# Patient Record
Sex: FEMALE | Race: WHITE | Employment: FULL TIME | URBAN - METROPOLITAN AREA
[De-identification: names, ages, dates, MRNs, and addresses within clinical notes are randomized per-mention and may not be internally consistent; named-entity substitution may affect disease eponyms.]

---

## 2022-04-11 ENCOUNTER — OFFICE VISIT (OUTPATIENT)
Dept: INTERNAL MEDICINE CLINIC | Facility: CLINIC | Age: 32
End: 2022-04-11
Payer: COMMERCIAL

## 2022-04-11 VITALS
OXYGEN SATURATION: 97 % | BODY MASS INDEX: 34.79 KG/M2 | SYSTOLIC BLOOD PRESSURE: 110 MMHG | HEART RATE: 83 BPM | DIASTOLIC BLOOD PRESSURE: 64 MMHG | TEMPERATURE: 98 F | HEIGHT: 67 IN | WEIGHT: 221.63 LBS

## 2022-04-11 DIAGNOSIS — E11.00 UNCONTROLLED TYPE 2 DM WITH HYPEROSMOLAR NONKETOTIC HYPERGLYCEMIA (HCC): Primary | ICD-10-CM

## 2022-04-11 DIAGNOSIS — E66.09 CLASS 1 OBESITY DUE TO EXCESS CALORIES WITHOUT SERIOUS COMORBIDITY WITH BODY MASS INDEX (BMI) OF 34.0 TO 34.9 IN ADULT: ICD-10-CM

## 2022-04-11 PROBLEM — J32.8 OTHER CHRONIC SINUSITIS: Status: ACTIVE | Noted: 2020-07-28

## 2022-04-11 PROCEDURE — 99203 OFFICE O/P NEW LOW 30 MIN: CPT | Performed by: INTERNAL MEDICINE

## 2022-04-11 PROCEDURE — 3008F BODY MASS INDEX DOCD: CPT | Performed by: INTERNAL MEDICINE

## 2022-04-11 PROCEDURE — 3074F SYST BP LT 130 MM HG: CPT | Performed by: INTERNAL MEDICINE

## 2022-04-11 PROCEDURE — 3078F DIAST BP <80 MM HG: CPT | Performed by: INTERNAL MEDICINE

## 2022-05-31 ENCOUNTER — TELEPHONE (OUTPATIENT)
Dept: INTERNAL MEDICINE CLINIC | Facility: CLINIC | Age: 32
End: 2022-05-31

## 2022-05-31 NOTE — TELEPHONE ENCOUNTER
Pt is calling stating that pcp had sent her to endocrinology and is about 5 weeks pregnant. pt states that she has an appointment with pcp on June 13 wants to know if pcp still wants to see her.       Please call and advise

## 2022-06-06 ENCOUNTER — OFFICE VISIT (OUTPATIENT)
Dept: ENDOCRINOLOGY CLINIC | Facility: CLINIC | Age: 32
End: 2022-06-06
Payer: COMMERCIAL

## 2022-06-06 ENCOUNTER — TELEPHONE (OUTPATIENT)
Dept: ENDOCRINOLOGY CLINIC | Facility: CLINIC | Age: 32
End: 2022-06-06

## 2022-06-06 VITALS
DIASTOLIC BLOOD PRESSURE: 78 MMHG | HEART RATE: 78 BPM | BODY MASS INDEX: 35 KG/M2 | WEIGHT: 224.69 LBS | SYSTOLIC BLOOD PRESSURE: 120 MMHG

## 2022-06-06 DIAGNOSIS — O24.111 PREGNANCY WITH TYPE 2 DIABETES MELLITUS IN FIRST TRIMESTER: Primary | ICD-10-CM

## 2022-06-06 LAB
CARTRIDGE LOT#: ABNORMAL NUMERIC
GLUCOSE BLOOD: 130
HEMOGLOBIN A1C: 7 % (ref 4.3–5.6)
TEST STRIP LOT #: NORMAL NUMERIC

## 2022-06-06 PROCEDURE — 82947 ASSAY GLUCOSE BLOOD QUANT: CPT | Performed by: NURSE PRACTITIONER

## 2022-06-06 PROCEDURE — 83036 HEMOGLOBIN GLYCOSYLATED A1C: CPT | Performed by: NURSE PRACTITIONER

## 2022-06-06 PROCEDURE — 3051F HG A1C>EQUAL 7.0%<8.0%: CPT | Performed by: NURSE PRACTITIONER

## 2022-06-06 PROCEDURE — 99204 OFFICE O/P NEW MOD 45 MIN: CPT | Performed by: NURSE PRACTITIONER

## 2022-06-06 PROCEDURE — 3074F SYST BP LT 130 MM HG: CPT | Performed by: NURSE PRACTITIONER

## 2022-06-06 PROCEDURE — 3078F DIAST BP <80 MM HG: CPT | Performed by: NURSE PRACTITIONER

## 2022-06-06 RX ORDER — PEN NEEDLE, DIABETIC 32GX 5/32"
NEEDLE, DISPOSABLE MISCELLANEOUS
Qty: 30 EACH | Refills: 1 | Status: SHIPPED | OUTPATIENT
Start: 2022-06-06 | End: 2022-06-10

## 2022-06-06 RX ORDER — INSULIN DETEMIR 100 [IU]/ML
6 INJECTION, SOLUTION SUBCUTANEOUS NIGHTLY
Qty: 3 ML | Refills: 0 | Status: SHIPPED | OUTPATIENT
Start: 2022-06-06 | End: 2022-07-26

## 2022-06-06 RX ORDER — PEN NEEDLE, DIABETIC 31 GX5/16"
1 NEEDLE, DISPOSABLE MISCELLANEOUS DAILY
Qty: 30 EACH | Refills: 1 | Status: SHIPPED | OUTPATIENT
Start: 2022-06-06

## 2022-06-06 NOTE — TELEPHONE ENCOUNTER
Pt called in stating she was advised to make appt for diabetes education. Pt is wondering if this can be a tele health appt.  Please call

## 2022-06-06 NOTE — PATIENT INSTRUCTIONS
A1C: 7.0% today --> decreased from 8.9% on 3/23/2022  Blood glucose: 130 in clinic today    Medications:   Start Levemir 6 units once nightly       -schedule apt with Homer Owusu (diabetes educator) within 1 week     -please send me a mychart with an update on your blood glucose readings every Thursday       It is important to follow a carb controlled meal plan. You need carbs in order to give energy for the fetus to develop   Carbs:   Insulin therapy:  45 gm Breakfast   60 gm lunch/dinner   15-30 gm HS snack w a protein    A1C goal:  <6.0%    Blood sugar testing:  Test your blood sugar 4 times daily   Recommended times to test: Before breakfast (fasting)       2hrs after breakfast       2hrs after lunch      2hrs after dinner   Blood sugar targets:  Before breakfast: < 90 g  2 hours after meals: < 120

## 2022-06-07 NOTE — TELEPHONE ENCOUNTER
LM to call clinic to change apt on 6/10/22 with Osvaldo Romo to f/u with [facility] for education (Patient prefers to see one provider and had consult with Akira Cramer on 6/6/22)

## 2022-06-09 ENCOUNTER — PATIENT MESSAGE (OUTPATIENT)
Dept: ENDOCRINOLOGY CLINIC | Facility: CLINIC | Age: 32
End: 2022-06-09

## 2022-06-09 DIAGNOSIS — O24.111 PREGNANCY WITH TYPE 2 DIABETES MELLITUS IN FIRST TRIMESTER: Primary | ICD-10-CM

## 2022-06-09 NOTE — TELEPHONE ENCOUNTER
Reviewed case with [facility]. Since I am not able to do telemedicine, advised to reach out to [facility] to see if they are able to. Spoke with Michelle Vazquez from [facility] who confirmed they can see her and she will call to schedule. Order placed for GDM. Pt notified via [employer]. Will plan to cancel appointment with [facility] once plan is confirmed with pt. FU scheduled with [facility] 7/11/2022.

## 2022-06-09 NOTE — TELEPHONE ENCOUNTER
From: Nuria Llamas  To: CYNDY Dupont  Sent: 6/9/2022 8:47 AM CDT  Subject: Appointment 6/10    I was waiting for a call back from the office. I requested our appointment be handled via zoom or some sort of FaceTime if possible. My morning sickness/nausea has been very uncomfortable early mornings especially while driving. I really am eager for this education to make sure my body and baby are getting the proper nutrients all while keeping my sugar under control. Is this possible?

## 2022-06-10 ENCOUNTER — PATIENT MESSAGE (OUTPATIENT)
Dept: ENDOCRINOLOGY CLINIC | Facility: CLINIC | Age: 32
End: 2022-06-10

## 2022-06-10 RX ORDER — INSULIN ASPART 100 [IU]/ML
INJECTION, SOLUTION INTRAVENOUS; SUBCUTANEOUS
Qty: 15 ML | Refills: 0 | Status: SHIPPED | OUTPATIENT
Start: 2022-06-10

## 2022-06-10 RX ORDER — PEN NEEDLE, DIABETIC 32GX 5/32"
NEEDLE, DISPOSABLE MISCELLANEOUS
Qty: 400 EACH | Refills: 1 | Status: SHIPPED | OUTPATIENT
Start: 2022-06-10

## 2022-06-10 NOTE — TELEPHONE ENCOUNTER
Reviewed case with [facility];  Advised to following:    - Increase Levemir to 10 units daily  - Start Novolog (or preferred fast acting) 4 units at each meal    LVOM 10:34am; will mychart message back as well

## 2022-06-10 NOTE — TELEPHONE ENCOUNTER
Spoke with pt and reviewed blood sugars, increasing Levemir, and starting Novolog    Topics discussed:  - Lantus and Novolog MOA  - Priming with 2units of Novolog, administration, and storage  - Blood sugar target ranges  - Very common in pregnancy to increase insulin  - Reviewed nutrition   -- Do not dose differently for foods high in fiber   -- Body needs carbs especially during pregnancy; aim for 175g  - Weight management/gain throughout pregnancy would be discussed by OB  - Appt Monday with [facility] for nutrition (wanted telemedicine)  - Since starting Novolog, advised to send in BG on Tuesday or Weds    Updated regimen:  - Levemir 10 units  - Novolog 4 units TID with meals

## 2022-06-10 NOTE — TELEPHONE ENCOUNTER
From: Alejandra Garcia  To: CYNDY Martinez  Sent: 6/10/2022 8:26 AM CDT  Subject: Sugars 6/6-6/9    Attachment

## 2022-06-13 ENCOUNTER — HOSPITAL ENCOUNTER (OUTPATIENT)
Dept: ENDOCRINOLOGY | Facility: HOSPITAL | Age: 32
End: 2022-06-13
Payer: COMMERCIAL

## 2022-06-13 DIAGNOSIS — O24.119 TYPE 2 DIABETES MELLITUS DURING PREGNANCY, ANTEPARTUM: Primary | ICD-10-CM

## 2022-06-13 NOTE — PROGRESS NOTES
Meena Lopez  : 1990 was seen for Type 2 DM in pregnancy counseling: Individual    Date: 2022   Start time: 9A End time: 10:10A    Realtime audio-video visit via Haiku/[employer]. Individual visit due to pt preference  Pt verbally consents to this audio-video visit. Fam hx of Type 2 DM, diagnosed earlier this year    Diet Recall:   (am) 2 eggs, 3 pieces of breakfast turkey sausage (adding banana)  (lunch) salad with salmon, chicken breast  (dinner) chicken breast with asparagus, sweet potatoes  (snacks) pure protein, pure protein bar, handful of wheat thins, nuts  (fluids) nothing but water, gatorade zero     Eating this way for 2 years   Some nausea   due date  On levemir  Seeing endo - started on long and rapid acting insulin - see notes      *Ask weight next visit, pt concerned with varying weights with changing eating habits    Education:     GDM Overview:  Reviewed gestational diabetes as diagnosis including target blood glucose values. Benefits, risks, and management options for improving/maintaining glucose control to mother/baby discussed. Discussed monitoring ketones. Healthy Eating:  Discussed nutrition concepts for pregnancy/healthy eating and effects of food on BG value. Timing of meals; what is a carbohydrate, protein, fat. Taught: Carb counting, label reading, meal planning. Suggested Calorie Level: 2000    Encouraged at least 175 g of carbs per day    Being Active:  Benefits and effects of activity on BG discussed. Monitoring:  Instructed on how to use glucose monitor/proper lancet disposal. Testing schedules are:   Fastin-95 mg/dL, Call MD if >95 mg/dL twice in 1 week   2 Hour Post Prandial:  120 md/dL, Call MD if >120 mg/dL twice in 1 week. Taking Medication:  Reviewed when medication might be indicated. Reducing Risk:  Effects of elevated blood glucose on mother/baby reviewed.   Discussed management (hyperglycemia, hypoglycemia, sick day, DKA, other) and when to call provider. Post pregnancy management/prevention of Type 2 DM, and increased risk of having diabetes later in life reviewed. Healthy Coping:  Family involvement/social support encouraged. Identification of lifestyle behaviors willing to change discussed. Training Tools Provided:  Printed materials covering each topic provided. Support Plan provided and reviewed. Patient Chosen Goals:      1. Follow recommended GDM meal plan. 2. Begin testing fasting glucose and 2 hours after meals   3. Bring glucose log to each MD office visit. 4. Encouraged activity if no restrictions. 5. Encouraged Haritha Hitchcock to call diabetes center with any questions or concerns. Patient verbalized understanding and has no further questions at this time.     Alvarez Blackman RD

## 2022-06-22 ENCOUNTER — PATIENT MESSAGE (OUTPATIENT)
Dept: ENDOCRINOLOGY CLINIC | Facility: CLINIC | Age: 32
End: 2022-06-22

## 2022-06-23 ENCOUNTER — TELEPHONE (OUTPATIENT)
Dept: ENDOCRINOLOGY | Facility: HOSPITAL | Age: 32
End: 2022-06-23

## 2022-06-27 ENCOUNTER — TELEPHONE (OUTPATIENT)
Dept: ENDOCRINOLOGY | Facility: HOSPITAL | Age: 32
End: 2022-06-27

## 2022-06-27 ENCOUNTER — HOSPITAL ENCOUNTER (OUTPATIENT)
Dept: ENDOCRINOLOGY | Facility: HOSPITAL | Age: 32
End: 2022-06-27
Payer: COMMERCIAL

## 2022-06-27 DIAGNOSIS — O24.119 TYPE 2 DIABETES MELLITUS DURING PREGNANCY, ANTEPARTUM: Primary | ICD-10-CM

## 2022-06-27 NOTE — PROGRESS NOTES
Juan Leblanc  : 1990 was seen for GDM  Individual Follow-Up Counseling    Date: 2022  Referring Provider: Marivel Burgos  Start time: 3P End time: 405P    Realtime audio-video visit via Haiku/[employer]. Individual visit due to pt preference. Pt verbally consents to this audio-video visit. Assessment: LMP 2022   Weight: Wt Readings from Last 6 Encounters:  22 : 224 lb 11.2 oz  22 : 221 lb 9.6 oz      Blood Glucose: FB/7 over 95. PP: B:  (x1 elevated); L: 137, 207, 118, 226, 118; D: 145, 130, 99, 126, 128. Ketones: n/a    Gestational Diabetes goals assessed by patient: 4 - frequently, continue with current goals/plan     Declines weight change   Declines insulin pump at this time - described in dpeth, encouraged     Has been talking to Dann Syed about blood sugars, encouraged increase of 2 units to levemir due to elevated ams    Diet:     Following meal plan: Yes/No: Yes - moderately  Skips: snacks, not needed  Meals are Balanced. Carb Intake is adequate. Protein Intake is adequate. Food Selections are healthy. Exercise:     30 min daily, walking dog    Education:     GDM Overview:  Reviewed target blood glucose values for GDM. Discussed benefits/risks to mother/baby management options to improve/maintain glucose control. Healthy Eating:  Reviewed/Reinforced:  Nutrition concepts for pregnancy/healthy eating and effect of food on blood glucose. Meal planning process and benefits of pre-planning meals/snacks. Appropriate timing of meals/snacks. Carb counting. Additional concepts: Increasing fiber    Being Active:  Reviewed benefits of effects of activity on BG values. Reviewed types of activity, duration, precautions. Monitoring:  Instructed to report readings to MD as directed. Call MD: if FBG is > 95 twice in 1 week. If 2 hr PP is > 120 twice in 1 week at any one meal.  Call Diabetic Educator is ketones are consistently elevated.     Taking Medication:  Reviewed appropriate timing (if on insulin) of meds. Reviewed probability of needing medication adjustments throughout pregnancy. Reducing Risk:  Discussed management of (hyperglycemia, hypoglycemia) and when to call provider. Recommendations:      1. Follow recommended meal plan. 2. Test blood glucose and ketones as directed. 3. Bring glucose log to each MD visit. 4. Start/continue activity if no restrictions. 5. Additional recommendations: Obtain glucose tabs - see patient instructions    Patient verbalized understanding and has no further questions at this time.     Shanthi Zarco RD

## 2022-07-05 NOTE — TELEPHONE ENCOUNTER
Coretta Garcia,    Please see attached blood sugar log:    As of 6/27  - Continue with Levemir 22 units nightly  - Continue Novolog 8 units at breakfast  - Continue Novolog 4 units at lunch  - Increase Novolog to 16 units at dinner

## 2022-07-06 ENCOUNTER — PATIENT MESSAGE (OUTPATIENT)
Dept: ENDOCRINOLOGY CLINIC | Facility: CLINIC | Age: 32
End: 2022-07-06

## 2022-07-11 ENCOUNTER — LAB ENCOUNTER (OUTPATIENT)
Dept: LAB | Age: 32
End: 2022-07-11
Attending: NURSE PRACTITIONER
Payer: COMMERCIAL

## 2022-07-11 ENCOUNTER — APPOINTMENT (OUTPATIENT)
Dept: ENDOCRINOLOGY | Facility: HOSPITAL | Age: 32
End: 2022-07-11
Payer: COMMERCIAL

## 2022-07-11 ENCOUNTER — OFFICE VISIT (OUTPATIENT)
Dept: ENDOCRINOLOGY CLINIC | Facility: CLINIC | Age: 32
End: 2022-07-11
Payer: COMMERCIAL

## 2022-07-11 VITALS
WEIGHT: 223.31 LBS | BODY MASS INDEX: 35 KG/M2 | HEART RATE: 82 BPM | DIASTOLIC BLOOD PRESSURE: 84 MMHG | SYSTOLIC BLOOD PRESSURE: 131 MMHG

## 2022-07-11 DIAGNOSIS — O24.111 PREGNANCY WITH TYPE 2 DIABETES MELLITUS IN FIRST TRIMESTER: ICD-10-CM

## 2022-07-11 DIAGNOSIS — O24.111 PREGNANCY WITH TYPE 2 DIABETES MELLITUS IN FIRST TRIMESTER: Primary | ICD-10-CM

## 2022-07-11 LAB
CARTRIDGE LOT#: ABNORMAL NUMERIC
CREAT UR-SCNC: 120 MG/DL
GLUCOSE BLOOD: 133
HEMOGLOBIN A1C: 6.2 % (ref 4.3–5.6)
MICROALBUMIN UR-MCNC: 0.73 MG/DL
MICROALBUMIN/CREAT 24H UR-RTO: 6.1 UG/MG (ref ?–30)
TEST STRIP LOT #: NORMAL NUMERIC

## 2022-07-11 PROCEDURE — 82570 ASSAY OF URINE CREATININE: CPT

## 2022-07-11 PROCEDURE — 3061F NEG MICROALBUMINURIA REV: CPT | Performed by: NURSE PRACTITIONER

## 2022-07-11 PROCEDURE — 36416 COLLJ CAPILLARY BLOOD SPEC: CPT | Performed by: NURSE PRACTITIONER

## 2022-07-11 PROCEDURE — 3075F SYST BP GE 130 - 139MM HG: CPT | Performed by: NURSE PRACTITIONER

## 2022-07-11 PROCEDURE — 3044F HG A1C LEVEL LT 7.0%: CPT | Performed by: NURSE PRACTITIONER

## 2022-07-11 PROCEDURE — 82947 ASSAY GLUCOSE BLOOD QUANT: CPT | Performed by: NURSE PRACTITIONER

## 2022-07-11 PROCEDURE — 3079F DIAST BP 80-89 MM HG: CPT | Performed by: NURSE PRACTITIONER

## 2022-07-11 PROCEDURE — 99214 OFFICE O/P EST MOD 30 MIN: CPT | Performed by: NURSE PRACTITIONER

## 2022-07-11 PROCEDURE — 82043 UR ALBUMIN QUANTITATIVE: CPT

## 2022-07-11 PROCEDURE — 83036 HEMOGLOBIN GLYCOSYLATED A1C: CPT | Performed by: NURSE PRACTITIONER

## 2022-07-11 RX ORDER — NAPROXEN SODIUM 220 MG
TABLET ORAL
Qty: 30 EACH | Refills: 2 | Status: SHIPPED | OUTPATIENT
Start: 2022-07-11

## 2022-07-11 RX ORDER — DOXYLAMINE SUCCINATE AND PYRIDOXINE HYDROCHLORIDE, DELAYED RELEASE TABLETS 10 MG/10 MG 10; 10 MG/1; MG/1
TABLET, DELAYED RELEASE ORAL
COMMUNITY
Start: 2022-06-27

## 2022-07-11 NOTE — TELEPHONE ENCOUNTER
Arther Halsted has appt today    -Levemir 28 units nightly   -Novolog 8 units with breakfast   -Novolog 6 units with lunch  -Novolog 16 units with dinner

## 2022-07-11 NOTE — PATIENT INSTRUCTIONS
A1C: 6.2% today -->decreased from 7.0% on 6/6/2022  Blood glucose: 133 in clinic today    Medications:   -stop Levemir    -start NPH 34 units nightly     -continue with Novolog 8 units with breakfast                            6 units with lunch                                  16 units with dinner     A1C goal:  <6.0%    Blood sugar testing:  Test your blood sugar 4 times daily   Recommended times to test: Before breakfast, 2hrs after breakfast, 2hrs after lunch and 2hrs after dinner     Blood sugar targets:  Before breakfast: <90  2 hours after meals: <120

## 2022-07-12 ENCOUNTER — PATIENT MESSAGE (OUTPATIENT)
Dept: ENDOCRINOLOGY CLINIC | Facility: CLINIC | Age: 32
End: 2022-07-12

## 2022-07-12 NOTE — TELEPHONE ENCOUNTER
RN called [facility] , surya told rn syringes will be filled for patient today.   Patient notified via my chart

## 2022-07-13 ENCOUNTER — PATIENT MESSAGE (OUTPATIENT)
Dept: ENDOCRINOLOGY CLINIC | Facility: CLINIC | Age: 32
End: 2022-07-13

## 2022-07-14 NOTE — TELEPHONE ENCOUNTER
Alejandro Real 28 units nightly   - Novolog 8 units with breakfast   - Novolog 6 units with lunch  - Novolog 16 units with dinner

## 2022-07-18 LAB
HBV SURFACE AB SER QL: NEGATIVE
HIV 1+2 AB+HIV1 P24 AG SERPL QL IA: NONREACTIVE
RPR SER QL: NONREACTIVE
RUBV IGG SERPL IA-ACNC: NORMAL

## 2022-07-19 ENCOUNTER — PATIENT MESSAGE (OUTPATIENT)
Dept: ENDOCRINOLOGY CLINIC | Facility: CLINIC | Age: 32
End: 2022-07-19

## 2022-07-19 NOTE — TELEPHONE ENCOUNTER
Walter Campbell,    Levemir 28 units  Novolog 8/6/16    Reports also printed and placed on your since she last sent values (7/13/2022)    Date F AB AL AD Regimen   7/14 81    Novolog 8/6/16 Levemir 28   7/15 89 96 119     7/16 76 113 120     7/17 87 137 (cookies) 76 147 (icecream    7/18 78 65 127 127 (ate out)    7/19 81 84

## 2022-07-25 ENCOUNTER — HOSPITAL ENCOUNTER (OUTPATIENT)
Dept: ENDOCRINOLOGY | Facility: HOSPITAL | Age: 32
End: 2022-07-25
Payer: COMMERCIAL

## 2022-07-25 DIAGNOSIS — O24.119 TYPE 2 DIABETES MELLITUS DURING PREGNANCY, ANTEPARTUM: Primary | ICD-10-CM

## 2022-07-25 NOTE — TELEPHONE ENCOUNTER
BG readings reviewed and agree with recommendations by Randall Flores no adjustments needed to insulin regimen. Thank you!

## 2022-07-25 NOTE — TELEPHONE ENCOUNTER
Geeta Tolentino,    Reviewed blood sugars and advised to continue regimen. Let her know that you will return Weds and if there are any further recommendations, you would would reach out then (of note, lunch is slightly higher but still within range).     Date F AB AL AD Regimen   7/14 81       Novolog 8/6/16   NPH 34   7/15 89 96 119       7/16 76 113 120       7/17 87 137 (cookies) 76 147 (icecream     7/18 78 65 127 127 (ate out)     7/19 81 84      Novolog 6/6/16  NPH 34   7/20 91 90 90 104    7/21 81 97 119 NA (fell asleep)    7/22 97 104 119 99    7/23 79 81 115 115    7/24 90 78 119

## 2022-07-25 NOTE — PROGRESS NOTES
Gurdeep Cade  : 1990 was seen for GDM  Individual Follow-Up Counseling    Date: 2022  Referring Provider: Elyse Pepper  Start time: 1030A End time: 11A    Realtime audio-video visit via Haiku/[employer]. Individual visit due to pt preference. Pt verbally consents to this audio-video visit. Assessment: LMP 2022   Weight: Wt Readings from Last 6 Encounters:  22 : 223 lb 4.8 oz  22 : 224 lb 11.2 oz  22 : 221 lb 9.6 oz    Asking about weight gain recommendations during pregnancy. Lost 25# from [facility] until conception    Blood Glucose: See scanned document. Ketones: negative ketones when checked 3-4 days    Gestational Diabetes goals assessed by patient: 4 - frequently, continue with current goals/plan     Declines weight change   Declines insulin pump at this time - described in depth, encouraged     Has been talking to [facility] about blood sugars, occasional lows post B and novolog was decreased    Diet Recall:   (am) breakfast sandwich - egg, turkey  Running errands  Small 17 g carb snack  (4p) maggiano[employer] - limited carbs/balanced meal   (late evening) sugar free pudding    Diet:     Following meal plan: Yes/No: Yes - moderately  Skips: snacks, not needed  Meals are Balanced. Carb Intake is adequate. Protein Intake is adequate. Food Selections are healthy. Some nausea/food aversion still    Exercise:     30 min daily, walking dog    Education:     GDM Overview:  Reviewed target blood glucose values for GDM. Discussed benefits/risks to mother/baby management options to improve/maintain glucose control. Healthy Eating:  Reviewed/Reinforced:  Nutrition concepts for pregnancy/healthy eating and effect of food on blood glucose. Meal planning process and benefits of pre-planning meals/snacks. Appropriate timing of meals/snacks. Carb counting. Additional concepts: Increasing fiber    Being Active:  Reviewed benefits of effects of activity on BG values.   Reviewed types of activity, duration, precautions. Monitoring:  Instructed to report readings to MD as directed. Call MD: if FBG is > 95 twice in 1 week. If 2 hr PP is > 120 twice in 1 week at any one meal.  Call Diabetic Educator is ketones are consistently elevated. Taking Medication:  Reviewed appropriate timing (if on insulin) of meds. Reviewed probability of needing medication adjustments throughout pregnancy. Reducing Risk:  Discussed management of (hyperglycemia, hypoglycemia) and when to call provider. Recommendations:      1. Follow recommended meal plan. 2. Test blood glucose and ketones as directed. 3. Bring glucose log to each MD visit. 4. Start/continue activity if no restrictions. 5. Additional recommendations: See prior patient instructions. Continue to test ketones if less than 175 g of carbs or downtrending weight. Patient verbalized understanding and has no further questions at this time.     Odalis Olvera RD

## 2022-08-02 ENCOUNTER — PATIENT MESSAGE (OUTPATIENT)
Dept: ENDOCRINOLOGY CLINIC | Facility: CLINIC | Age: 32
End: 2022-08-02

## 2022-08-02 RX ORDER — INSULIN ASPART 100 [IU]/ML
INJECTION, SOLUTION INTRAVENOUS; SUBCUTANEOUS
Qty: 25 ML | Refills: 0 | Status: SHIPPED | OUTPATIENT
Start: 2022-08-02 | End: 2022-08-08

## 2022-08-02 NOTE — TELEPHONE ENCOUNTER
NPH 34 units nightly     Novolog 6 units with breakfast    6 units with lunch   16 units with dinner     Pended rx updated to make above doses. Thank you!

## 2022-08-08 RX ORDER — INSULIN ASPART 100 [IU]/ML
INJECTION, SOLUTION INTRAVENOUS; SUBCUTANEOUS
Qty: 27 ML | Refills: 0 | Status: SHIPPED | OUTPATIENT
Start: 2022-08-08

## 2022-08-09 ENCOUNTER — PATIENT MESSAGE (OUTPATIENT)
Dept: ENDOCRINOLOGY CLINIC | Facility: CLINIC | Age: 32
End: 2022-08-09

## 2022-08-09 RX ORDER — NAPROXEN SODIUM 220 MG
TABLET ORAL
Qty: 30 EACH | Refills: 2 | Status: SHIPPED | OUTPATIENT
Start: 2022-08-09

## 2022-08-09 NOTE — TELEPHONE ENCOUNTER
NPH 34 units  Novolog 16  Placed on your desk    Fastin/3 98   74  : 70  : 90  : 86  : 86  : 81    AB:  8/3: 130   94  : 110  : 92  : 96  : 91    AL:  8/3: 109  : 147  : 86  : 86    After Dinner:  8/3: 82  : 110  : 105  : 130

## 2022-08-22 ENCOUNTER — OFFICE VISIT (OUTPATIENT)
Dept: ENDOCRINOLOGY CLINIC | Facility: CLINIC | Age: 32
End: 2022-08-22
Payer: COMMERCIAL

## 2022-08-22 VITALS
BODY MASS INDEX: 35 KG/M2 | WEIGHT: 222 LBS | HEART RATE: 89 BPM | DIASTOLIC BLOOD PRESSURE: 85 MMHG | SYSTOLIC BLOOD PRESSURE: 122 MMHG

## 2022-08-22 DIAGNOSIS — O24.112 PREGNANCY WITH TYPE 2 DIABETES MELLITUS IN SECOND TRIMESTER: Primary | ICD-10-CM

## 2022-08-22 LAB
CARTRIDGE LOT#: NORMAL NUMERIC
GLUCOSE BLOOD: 114
TEST STRIP LOT #: NORMAL NUMERIC

## 2022-08-22 NOTE — PATIENT INSTRUCTIONS
A1C: 4.9% today --> decreased from 6.2% on 7/11/2022  Blood glucose: 79 in clinic today    Medications:   -Decrease NPH 28 units nightly    - if in the next 2-3 days, fasting BG readings are in 70s, decrease NPH dose to 26 units nightly   -Decrease Novolog: stop with breakfast         4 units with lunch                     16--> 14 units with dinner     Weight:  Wt Readings from Last 6 Encounters:  08/22/22 : 222 lb (100.7 kg)  07/11/22 : 223 lb 4.8 oz (101.3 kg)  06/06/22 : 224 lb 11.2 oz (101.9 kg)  04/11/22 : 221 lb 9.6 oz (100.5 kg)     A1C goal:   <6.0%    Blood sugar testing:  Test your blood sugar 4 times daily   Recommended times to test: Before breakfast (fasting) and 2hrs after meals     Blood sugar targets:  Before breakfast: <90  2 hours after meals: <120
No

## 2022-08-30 ENCOUNTER — PATIENT MESSAGE (OUTPATIENT)
Dept: ENDOCRINOLOGY CLINIC | Facility: CLINIC | Age: 32
End: 2022-08-30

## 2022-08-31 NOTE — TELEPHONE ENCOUNTER
From: Bernice Ly  Sent: 8/30/2022 2:44 PM CDT  To: Elyse Kevin Clinical Staff  Subject: Sugars     Also, when should I be in next for a visit.  I can book it through my chart

## 2022-09-05 ENCOUNTER — PATIENT MESSAGE (OUTPATIENT)
Dept: ENDOCRINOLOGY CLINIC | Facility: CLINIC | Age: 32
End: 2022-09-05

## 2022-09-06 RX ORDER — NAPROXEN SODIUM 220 MG
TABLET ORAL
Qty: 30 EACH | Refills: 2 | Status: SHIPPED | OUTPATIENT
Start: 2022-09-06

## 2022-09-08 ENCOUNTER — PATIENT MESSAGE (OUTPATIENT)
Dept: ENDOCRINOLOGY CLINIC | Facility: CLINIC | Age: 32
End: 2022-09-08

## 2022-09-20 NOTE — TELEPHONE ENCOUNTER
Routing BG update attachment to provider to review    NPH: 26 units nightly  Novolog 4 units with lunch, 14 units with dinner

## 2022-09-26 ENCOUNTER — OFFICE VISIT (OUTPATIENT)
Dept: ENDOCRINOLOGY CLINIC | Facility: CLINIC | Age: 32
End: 2022-09-26
Payer: COMMERCIAL

## 2022-09-26 VITALS
DIASTOLIC BLOOD PRESSURE: 75 MMHG | BODY MASS INDEX: 35 KG/M2 | SYSTOLIC BLOOD PRESSURE: 112 MMHG | HEART RATE: 92 BPM | WEIGHT: 226 LBS

## 2022-09-26 DIAGNOSIS — O24.112 PREGNANCY WITH TYPE 2 DIABETES MELLITUS IN SECOND TRIMESTER: Primary | ICD-10-CM

## 2022-09-26 LAB
CARTRIDGE LOT#: NORMAL NUMERIC
GLUCOSE BLOOD: 132
TEST STRIP LOT #: NORMAL NUMERIC

## 2022-09-26 PROCEDURE — 3044F HG A1C LEVEL LT 7.0%: CPT | Performed by: NURSE PRACTITIONER

## 2022-09-26 PROCEDURE — 3078F DIAST BP <80 MM HG: CPT | Performed by: NURSE PRACTITIONER

## 2022-09-26 PROCEDURE — 99213 OFFICE O/P EST LOW 20 MIN: CPT | Performed by: NURSE PRACTITIONER

## 2022-09-26 PROCEDURE — 83036 HEMOGLOBIN GLYCOSYLATED A1C: CPT | Performed by: NURSE PRACTITIONER

## 2022-09-26 PROCEDURE — 3074F SYST BP LT 130 MM HG: CPT | Performed by: NURSE PRACTITIONER

## 2022-09-26 PROCEDURE — 82947 ASSAY GLUCOSE BLOOD QUANT: CPT | Performed by: NURSE PRACTITIONER

## 2022-09-26 NOTE — PATIENT INSTRUCTIONS
A1C: 4.8% today -->decrease from 4.9% on 2022  Blood glucose: 132 in clinic today    Medications:   - continue with NPH 26 units nightly   - decrease Novolo units with lunch                        12 units with dinner     A1C goal:  <6.0%    Blood sugar testing:  Test your blood sugar 4 times daily   Recommended times to test: before breakfast (fasting) and 2hrs after meals     Blood sugar targets:  Before breakfast: <90  2 hours after meals: <120

## 2022-09-26 NOTE — TELEPHONE ENCOUNTER
[facility],    Please see patient message.  I asked patient to provide updated BG log from week of 9/19

## 2022-09-30 ENCOUNTER — PATIENT MESSAGE (OUTPATIENT)
Dept: ENDOCRINOLOGY CLINIC | Facility: CLINIC | Age: 32
End: 2022-09-30

## 2022-10-07 RX ORDER — NAPROXEN SODIUM 220 MG
TABLET ORAL
Qty: 30 EACH | Refills: 2 | Status: SHIPPED | OUTPATIENT
Start: 2022-10-07

## 2022-10-10 RX ORDER — HUMAN INSULIN 100 [IU]/ML
INJECTION, SUSPENSION SUBCUTANEOUS
Qty: 30 ML | Refills: 0 | Status: SHIPPED | OUTPATIENT
Start: 2022-10-10

## 2022-10-12 ENCOUNTER — PATIENT MESSAGE (OUTPATIENT)
Dept: ENDOCRINOLOGY CLINIC | Facility: CLINIC | Age: 32
End: 2022-10-12

## 2022-10-12 NOTE — TELEPHONE ENCOUNTER
Dear Zaira Sibley,    Here is the patient's last MyChart message,     BG log:        AM           BF         L             D  10/8 90 113 126-forgot insulin Not recorded   10/9 81 98 Not recorded 125   10/10 91 125 84 121   10/11 85 82 Not recorded Not recorded   10/12 98        Patient confirmed NPH to 22 units which is seen on MyChart 9/30

## 2022-10-19 NOTE — TELEPHONE ENCOUNTER
King Lety on your desk    - NPH 22 units  - Novolog only at dinner 12 units (stopped lunch 1 week ago)

## 2022-10-28 ENCOUNTER — PATIENT MESSAGE (OUTPATIENT)
Dept: ENDOCRINOLOGY CLINIC | Facility: CLINIC | Age: 32
End: 2022-10-28

## 2022-10-31 ENCOUNTER — OFFICE VISIT (OUTPATIENT)
Dept: ENDOCRINOLOGY CLINIC | Facility: CLINIC | Age: 32
End: 2022-10-31
Payer: COMMERCIAL

## 2022-10-31 VITALS
WEIGHT: 235.31 LBS | SYSTOLIC BLOOD PRESSURE: 131 MMHG | DIASTOLIC BLOOD PRESSURE: 81 MMHG | HEART RATE: 98 BPM | BODY MASS INDEX: 37 KG/M2

## 2022-10-31 DIAGNOSIS — O24.112 PREGNANCY WITH TYPE 2 DIABETES MELLITUS IN SECOND TRIMESTER: Primary | ICD-10-CM

## 2022-10-31 LAB
CARTRIDGE LOT#: NORMAL NUMERIC
GLUCOSE BLOOD: 158
HEMOGLOBIN A1C: 4.7 % (ref 4.3–5.6)
TEST STRIP LOT #: NORMAL NUMERIC

## 2022-10-31 RX ORDER — PERPHENAZINE 16 MG/1
TABLET, FILM COATED ORAL
Qty: 400 EACH | Refills: 0 | Status: SHIPPED | OUTPATIENT
Start: 2022-10-31

## 2022-10-31 RX ORDER — PEN NEEDLE, DIABETIC 32GX 5/32"
NEEDLE, DISPOSABLE MISCELLANEOUS
Qty: 200 EACH | Refills: 0 | Status: SHIPPED | OUTPATIENT
Start: 2022-10-31

## 2022-10-31 RX ORDER — INSULIN ASPART 100 [IU]/ML
INJECTION, SOLUTION INTRAVENOUS; SUBCUTANEOUS
Qty: 3 ML | Refills: 0 | Status: SHIPPED | OUTPATIENT
Start: 2022-10-31

## 2022-10-31 NOTE — PATIENT INSTRUCTIONS
A1C: 4.7%  --> slight decrease from 4.8% on 2022  Blood glucose: 158 in clinic today    Medications:   -continue with NPH 24 units nightly (try injecting the same time each night)  -Increase Novolo --> 14 units with dinner     Weight:  Wt Readings from Last 6 Encounters:  10/31/22 : 235 lb 4.8 oz (106.7 kg)  22 : 226 lb (102.5 kg)  22 : 222 lb (100.7 kg)  22 : 223 lb 4.8 oz (101.3 kg)  22 : 224 lb 11.2 oz (101.9 kg)  22 : 221 lb 9.6 oz (100.5 kg)    A1C goal:  <6.0%    Blood sugar testing:  Test your blood sugar 4 times daily   Recommended times to test: Before breakfast (fasting) and 2hrs after meals     Blood sugar targets:  Before breakfast: <90  - when not pregnant: 80- 130 (preferably <110)  2 hours after meals: <120  - when not pregnant: <140       Call for persistent blood sugars < 75 or > 200

## 2022-11-05 ENCOUNTER — PATIENT MESSAGE (OUTPATIENT)
Dept: ENDOCRINOLOGY CLINIC | Facility: CLINIC | Age: 32
End: 2022-11-05

## 2022-11-06 ENCOUNTER — PATIENT MESSAGE (OUTPATIENT)
Dept: ENDOCRINOLOGY CLINIC | Facility: CLINIC | Age: 32
End: 2022-11-06

## 2022-11-06 RX ORDER — PEN NEEDLE, DIABETIC 32GX 5/32"
NEEDLE, DISPOSABLE MISCELLANEOUS
Qty: 100 EACH | Refills: 0 | Status: SHIPPED | OUTPATIENT
Start: 2022-11-06

## 2022-11-06 RX ORDER — NAPROXEN SODIUM 220 MG
TABLET ORAL
Qty: 30 EACH | Refills: 2 | Status: SHIPPED | OUTPATIENT
Start: 2022-11-06

## 2022-11-10 ENCOUNTER — PATIENT MESSAGE (OUTPATIENT)
Dept: ENDOCRINOLOGY CLINIC | Facility: CLINIC | Age: 32
End: 2022-11-10

## 2022-11-10 NOTE — TELEPHONE ENCOUNTER
[place],    Please placed on your desk for review    NPH 24 units nightly  Novolog 14 units with dinner    Right now your schedule is on hold - OK to offer Dec 5th at [facility]?

## 2022-11-11 NOTE — TELEPHONE ENCOUNTER
Please book apt as discussed with patient below. May ask Radha Ross for assistance as needed. Thank you!

## 2022-11-11 NOTE — TELEPHONE ENCOUNTER
From: Abram Jarrett  Sent: 11/10/2022 11:58 PM CST  To: Elyse Kevin Clinical Staff  Subject: Blood Glucose    12:45 will work    Thank you again!

## 2022-11-15 ENCOUNTER — PATIENT MESSAGE (OUTPATIENT)
Dept: ENDOCRINOLOGY CLINIC | Facility: CLINIC | Age: 32
End: 2022-11-15

## 2022-11-15 NOTE — TELEPHONE ENCOUNTER
Future Appointments   Date Time Provider Fidel Martínez   12/5/2022 12:45 PM CYNDY AlbrechtADOENDO ALIS [facility]O

## 2022-11-23 NOTE — TELEPHONE ENCOUNTER
Gerardo Real,    Please see patient update. Lets decrease NPH at bedtime from 24--> 20 units and also push it up by 2 hrs in the evening.  For example, if you typically take NPH at 9pm, lets try 7pm

## 2022-11-23 NOTE — TELEPHONE ENCOUNTER
Current medication regimen:   NPH 24 units bedtime   Novolog 12 units with dinner        Fasting 2hrsAB 2hrAL 2hrsAD  11/10 95 107 87 130  11/11 92 97 165 131  11/12 95 X X 124  11/13 105 135 X X   11/14 85 115 130 106  11/15 89 99 128 X  11/16 87 134 104 129  11/17 79

## 2022-12-01 ENCOUNTER — PATIENT MESSAGE (OUTPATIENT)
Dept: ENDOCRINOLOGY CLINIC | Facility: CLINIC | Age: 32
End: 2022-12-01

## 2022-12-01 NOTE — TELEPHONE ENCOUNTER
F 2hrB 2hrL 2hrD  11/24 88  132  11/25 100 79 84 (71) 133  11/26 97  113   11/28 101 91 82 110  11/29 100 198 70's (without eating)    NPH 20 (decrease from 24 11/23)  Novolog 12 with dinner    Reviewed regimen with [address] as Lenin Cee is out of office. Discussed either increasing NPH to 22 units or increasing dinner Novolog dose. Will plan to increase NPH to 22 units and update on Monday. Sooner, if low blood sugars occur.

## 2022-12-01 NOTE — TELEPHONE ENCOUNTER
Deutsch Holly responded to pt to see how the last few days of BG have been. The attached BG show slightly above target range for fasting () with NPH20 but I believe she was having low blood sugar with NPH24    Your Monday Dec 12th [facility] has 1 opening but you do have some openings for Dec 19th at [facility].

## 2022-12-02 ENCOUNTER — PATIENT MESSAGE (OUTPATIENT)
Dept: ENDOCRINOLOGY CLINIC | Facility: CLINIC | Age: 32
End: 2022-12-02

## 2022-12-02 NOTE — TELEPHONE ENCOUNTER
Marguerite Menjivar noted. Agree with recommendations given below. Ok to use any of the apts available for either day. 12/12 would be best if she is available. Thank you!

## 2022-12-05 ENCOUNTER — TELEPHONE (OUTPATIENT)
Dept: MATERNAL FETAL MEDICINE | Age: 32
End: 2022-12-05

## 2022-12-05 RX ORDER — NAPROXEN SODIUM 220 MG
TABLET ORAL
Qty: 30 EACH | Refills: 2 | Status: SHIPPED | OUTPATIENT
Start: 2022-12-05

## 2022-12-06 ENCOUNTER — TELEPHONE (OUTPATIENT)
Dept: MATERNAL FETAL MEDICINE | Age: 32
End: 2022-12-06

## 2022-12-07 ENCOUNTER — PATIENT MESSAGE (OUTPATIENT)
Dept: ENDOCRINOLOGY CLINIC | Facility: CLINIC | Age: 32
End: 2022-12-07

## 2022-12-07 ENCOUNTER — TELEPHONE (OUTPATIENT)
Dept: MATERNAL FETAL MEDICINE | Age: 32
End: 2022-12-07

## 2022-12-07 DIAGNOSIS — O24.112 PREGNANCY WITH TYPE 2 DIABETES MELLITUS IN SECOND TRIMESTER: Primary | ICD-10-CM

## 2022-12-07 DIAGNOSIS — Z79.4 ENCOUNTER FOR LONG-TERM (CURRENT) USE OF INSULIN (CMD): Primary | ICD-10-CM

## 2022-12-08 NOTE — TELEPHONE ENCOUNTER
Fasting  AB AL After dinner   12/2 91   12/3 101 93 144 (ate out) X  12/4 81 X X 127 (no insulin)   12/5 89 89 106 160 (ate out)  12/6 88 123 111 137    12/7 104

## 2022-12-12 ENCOUNTER — V-VISIT (OUTPATIENT)
Dept: MATERNAL FETAL MEDICINE | Age: 32
End: 2022-12-12
Attending: OBSTETRICS & GYNECOLOGY

## 2022-12-12 DIAGNOSIS — O24.113 PRE-EXISTING TYPE 2 DIABETES MELLITUS DURING PREGNANCY IN THIRD TRIMESTER: Primary | ICD-10-CM

## 2022-12-12 LAB
HIV 1+2 AB+HIV1 P24 AG SERPL QL IA: NONREACTIVE
RPR SER QL: NON REACTIVE

## 2022-12-12 PROCEDURE — 99203 OFFICE O/P NEW LOW 30 MIN: CPT | Performed by: OBSTETRICS & GYNECOLOGY

## 2022-12-12 RX ORDER — CYCLOBENZAPRINE HCL 10 MG
10 TABLET ORAL EVERY 8 HOURS PRN
Status: ON HOLD | COMMUNITY
Start: 2022-12-08 | End: 2023-01-20 | Stop reason: HOSPADM

## 2022-12-12 RX ORDER — PERPHENAZINE 16 MG/1
TABLET, FILM COATED ORAL
Status: ON HOLD | COMMUNITY
Start: 2022-11-01 | End: 2023-01-20 | Stop reason: HOSPADM

## 2022-12-12 RX ORDER — VITAMIN A ACETATE, BETA CAROTENE, ASCORBIC ACID, CHOLECALCIFEROL, .ALPHA.-TOCOPHEROL ACETATE, DL-, THIAMINE MONONITRATE, RIBOFLAVIN, NIACINAMIDE, PYRIDOXINE HYDROCHLORIDE, FOLIC ACID, CYANOCOBALAMIN, CALCIUM CARBONATE, FERROUS FUMARATE, ZINC OXIDE, CUPRIC OXIDE 3080; 12; 120; 400; 1; 1.84; 3; 20; 22; 920; 25; 200; 27; 10; 2 [IU]/1; UG/1; MG/1; [IU]/1; MG/1; MG/1; MG/1; MG/1; MG/1; [IU]/1; MG/1; MG/1; MG/1; MG/1; MG/1
1 TABLET, FILM COATED ORAL DAILY
COMMUNITY

## 2022-12-12 RX ORDER — HUMAN INSULIN 100 [IU]/ML
INJECTION, SUSPENSION SUBCUTANEOUS
Status: ON HOLD | COMMUNITY
Start: 2022-10-10 | End: 2023-01-20 | Stop reason: HOSPADM

## 2022-12-12 RX ORDER — INSULIN ASPART 100 [IU]/ML
INJECTION, SOLUTION INTRAVENOUS; SUBCUTANEOUS
Status: ON HOLD | COMMUNITY
Start: 2022-11-04 | End: 2023-01-20 | Stop reason: HOSPADM

## 2022-12-12 RX ORDER — ONDANSETRON 4 MG/1
4 TABLET, FILM COATED ORAL EVERY 6 HOURS PRN
Status: ON HOLD | COMMUNITY
Start: 2022-11-14 | End: 2023-01-20 | Stop reason: HOSPADM

## 2022-12-12 RX ORDER — PEN NEEDLE, DIABETIC 32GX 5/32"
NEEDLE, DISPOSABLE MISCELLANEOUS
COMMUNITY
Start: 2022-11-06

## 2022-12-12 RX ORDER — PEN NEEDLE, DIABETIC 29 G X1/2"
NEEDLE, DISPOSABLE MISCELLANEOUS
COMMUNITY
Start: 2022-12-05

## 2022-12-13 RX ORDER — INSULIN DETEMIR 100 [IU]/ML
10 INJECTION, SOLUTION SUBCUTANEOUS NIGHTLY
Qty: 3 ML | Refills: 0 | Status: SHIPPED | OUTPATIENT
Start: 2022-12-13

## 2022-12-14 NOTE — TELEPHONE ENCOUNTER
[facility] No [facility]. Left msg that she calls me back today. Patient will likely need to start taking Novololg with breakfast and possibly lunch also. Depending on her BG readings will have to decide on doses. Would like to clarify if she started to take levemir last night before recommending novolog meal doses. Thank you!

## 2022-12-14 NOTE — TELEPHONE ENCOUNTER
Odette Cherry,    Please see below. As long as the levemir pens have NOT been open and have been in the fridge, they are good till the expiration date on the pen/box.

## 2022-12-15 ENCOUNTER — PATIENT MESSAGE (OUTPATIENT)
Dept: ENDOCRINOLOGY CLINIC | Facility: CLINIC | Age: 32
End: 2022-12-15

## 2022-12-15 NOTE — TELEPHONE ENCOUNTER
Reviewed plan with José Winter ordered and sent to pt  - Appt changed to virtual   - [facility]

## 2022-12-17 LAB — HEMOGLOBIN A1C: 5.1 % OF TOTAL HGB

## 2022-12-19 ENCOUNTER — TELEMEDICINE (OUTPATIENT)
Dept: ENDOCRINOLOGY CLINIC | Facility: CLINIC | Age: 32
End: 2022-12-19

## 2022-12-19 DIAGNOSIS — O24.419 GESTATIONAL DIABETES MELLITUS IN PREGNANCY: Primary | ICD-10-CM

## 2022-12-19 DIAGNOSIS — O24.113 PREGNANCY COMPLICATED BY PRE-EXISTING TYPE 2 DIABETES IN THIRD TRIMESTER: Primary | ICD-10-CM

## 2022-12-19 NOTE — PATIENT INSTRUCTIONS
A1C: 5.1% on 12/16/2022 --> slight increase from 4.7% on 10/31/2022    Medications:   -increase levemir 14 --> 17 units nightly   -stop Novolog with breakfast   -no novolog with lunch  -continue with Novolog 14 units with dinner     After baby is delivered:  No medications       A1C goal:  <6.0%    Blood sugar testing:  Test your blood sugar 3 times daily   Recommended times to test: Before breakfast (fasting), 2hrs after breakfast, 2hrs after lunch and 2hrs after dinner     Blood sugar targets:  Before breakfast: <90  2 hours after meals: <120    Call for persistent blood sugars < 75 or > 200.

## 2022-12-21 ENCOUNTER — PATIENT MESSAGE (OUTPATIENT)
Dept: ENDOCRINOLOGY CLINIC | Facility: CLINIC | Age: 32
End: 2022-12-21

## 2022-12-23 LAB — GBS EXTERNAL: NEGATIVE

## 2022-12-27 ENCOUNTER — APPOINTMENT (OUTPATIENT)
Dept: CARDIOLOGY | Age: 32
End: 2022-12-27
Attending: OBSTETRICS & GYNECOLOGY

## 2022-12-27 NOTE — TELEPHONE ENCOUNTER
Amanda Finney,    Please see attached    Levemir 20 units  Novolog 17 units with dinner (previous MC recommended but 16 but BG log states 17)

## 2022-12-30 NOTE — TELEPHONE ENCOUNTER
LVOM 2:25pm as pt has not seen mychart; reviewed options on voicemail and that doctor is always on call too

## 2023-01-10 RX ORDER — HUMAN INSULIN 100 [IU]/ML
INJECTION, SUSPENSION SUBCUTANEOUS
Qty: 30 ML | Refills: 0 | Status: SHIPPED | OUTPATIENT
Start: 2023-01-10

## 2023-01-10 NOTE — TELEPHONE ENCOUNTER
LOV:10/31/22    RTC: 4wks    F/U:No FU/pt will FU after delivery date     Pending Monthly Supply:orders pending,please approve if appropriate.

## 2023-01-15 ENCOUNTER — LAB SERVICES (OUTPATIENT)
Dept: LAB | Age: 33
End: 2023-01-15

## 2023-01-15 DIAGNOSIS — Z01.812 ENCOUNTER FOR PREPROCEDURE SCREENING LABORATORY TESTING FOR COVID-19: Primary | ICD-10-CM

## 2023-01-15 DIAGNOSIS — Z11.52 ENCOUNTER FOR PREPROCEDURE SCREENING LABORATORY TESTING FOR COVID-19: Primary | ICD-10-CM

## 2023-01-15 PROCEDURE — U0003 INFECTIOUS AGENT DETECTION BY NUCLEIC ACID (DNA OR RNA); SEVERE ACUTE RESPIRATORY SYNDROME CORONAVIRUS 2 (SARS-COV-2) (CORONAVIRUS DISEASE [COVID-19]), AMPLIFIED PROBE TECHNIQUE, MAKING USE OF HIGH THROUGHPUT TECHNOLOGIES AS DESCRIBED BY CMS-2020-01-R: HCPCS | Performed by: INTERNAL MEDICINE

## 2023-01-15 PROCEDURE — U0005 INFEC AGEN DETEC AMPLI PROBE: HCPCS | Performed by: INTERNAL MEDICINE

## 2023-01-16 LAB
SARS-COV-2 RNA RESP QL NAA+PROBE: NOT DETECTED
SERVICE CMNT-IMP: NORMAL
SERVICE CMNT-IMP: NORMAL

## 2023-01-17 ENCOUNTER — APPOINTMENT (OUTPATIENT)
Dept: OBGYN | Age: 33
End: 2023-01-17

## 2023-01-17 ENCOUNTER — HOSPITAL ENCOUNTER (INPATIENT)
Age: 33
LOS: 3 days | Discharge: HOME | End: 2023-01-20
Attending: OBSTETRICS & GYNECOLOGY | Admitting: OBSTETRICS & GYNECOLOGY

## 2023-01-17 ENCOUNTER — ANESTHESIA (OUTPATIENT)
Dept: OBGYN | Age: 33
End: 2023-01-17

## 2023-01-17 ENCOUNTER — ANESTHESIA EVENT (OUTPATIENT)
Dept: OBGYN | Age: 33
End: 2023-01-17

## 2023-01-17 DIAGNOSIS — Z34.90 ENCOUNTER FOR INDUCTION OF LABOR: Primary | ICD-10-CM

## 2023-01-17 LAB
ABO + RH BLD: NORMAL
BLD GP AB SCN SERPL QL GEL: NEGATIVE
DEPRECATED RDW RBC: 44.7 FL (ref 39–50)
ERYTHROCYTE [DISTWIDTH] IN BLOOD: 14 % (ref 11–15)
GLUCOSE BLDC GLUCOMTR-MCNC: 104 MG/DL (ref 70–99)
GLUCOSE BLDC GLUCOMTR-MCNC: 106 MG/DL (ref 70–99)
GLUCOSE BLDC GLUCOMTR-MCNC: 110 MG/DL (ref 70–99)
GLUCOSE BLDC GLUCOMTR-MCNC: 115 MG/DL (ref 70–99)
GLUCOSE BLDC GLUCOMTR-MCNC: 88 MG/DL (ref 70–99)
GLUCOSE BLDC GLUCOMTR-MCNC: 89 MG/DL (ref 70–99)
GLUCOSE BLDC GLUCOMTR-MCNC: 89 MG/DL (ref 70–99)
GLUCOSE BLDC GLUCOMTR-MCNC: 90 MG/DL (ref 70–99)
GLUCOSE BLDC GLUCOMTR-MCNC: 94 MG/DL (ref 70–99)
GLUCOSE BLDC GLUCOMTR-MCNC: 96 MG/DL (ref 70–99)
GLUCOSE BLDC GLUCOMTR-MCNC: 96 MG/DL (ref 70–99)
HCT VFR BLD CALC: 37.8 % (ref 36–46.5)
HGB BLD-MCNC: 12.6 G/DL (ref 12–15.5)
MCH RBC QN AUTO: 29.8 PG (ref 26–34)
MCHC RBC AUTO-ENTMCNC: 33.3 G/DL (ref 32–36.5)
MCV RBC AUTO: 89.4 FL (ref 78–100)
NRBC BLD MANUAL-RTO: 0 /100 WBC
PLATELET # BLD AUTO: 183 K/MCL (ref 140–450)
RBC # BLD: 4.23 MIL/MCL (ref 4–5.2)
TYPE AND SCREEN EXPIRATION DATE: NORMAL
WBC # BLD: 10.8 K/MCL (ref 4.2–11)

## 2023-01-17 PROCEDURE — 10002800 HB RX 250 W HCPCS

## 2023-01-17 PROCEDURE — 3E033VJ INTRODUCTION OF OTHER HORMONE INTO PERIPHERAL VEIN, PERCUTANEOUS APPROACH: ICD-10-PCS | Performed by: OBSTETRICS & GYNECOLOGY

## 2023-01-17 PROCEDURE — 86901 BLOOD TYPING SEROLOGIC RH(D): CPT

## 2023-01-17 PROCEDURE — 10002803 HB RX 637

## 2023-01-17 PROCEDURE — 13003286 HB ROOM CHARGE L&D

## 2023-01-17 PROCEDURE — 10002801 HB RX 250 W/O HCPCS

## 2023-01-17 PROCEDURE — 13000012 HB ANESTHESIA SPINAL/EPIDURAL IN L&D

## 2023-01-17 PROCEDURE — 10002807 HB RX 258

## 2023-01-17 PROCEDURE — 10907ZC DRAINAGE OF AMNIOTIC FLUID, THERAPEUTIC FROM PRODUCTS OF CONCEPTION, VIA NATURAL OR ARTIFICIAL OPENING: ICD-10-PCS | Performed by: OBSTETRICS & GYNECOLOGY

## 2023-01-17 PROCEDURE — 3E0P7VZ INTRODUCTION OF HORMONE INTO FEMALE REPRODUCTIVE, VIA NATURAL OR ARTIFICIAL OPENING: ICD-10-PCS | Performed by: OBSTETRICS & GYNECOLOGY

## 2023-01-17 PROCEDURE — 85027 COMPLETE CBC AUTOMATED: CPT

## 2023-01-17 RX ORDER — LIDOCAINE HYDROCHLORIDE 10 MG/ML
30 INJECTION, SOLUTION EPIDURAL; INFILTRATION; INTRACAUDAL; PERINEURAL
Status: DISCONTINUED | OUTPATIENT
Start: 2023-01-17 | End: 2023-01-18

## 2023-01-17 RX ORDER — HEPARIN SOD,PORK IN 0.45% NACL 25000/500
INTRAVENOUS SOLUTION INTRAVENOUS CONTINUOUS
Status: DISCONTINUED | OUTPATIENT
Start: 2023-01-17 | End: 2023-01-18

## 2023-01-17 RX ORDER — 0.9 % SODIUM CHLORIDE 0.9 %
2 VIAL (ML) INJECTION EVERY 12 HOURS SCHEDULED
Status: DISCONTINUED | OUTPATIENT
Start: 2023-01-17 | End: 2023-01-18

## 2023-01-17 RX ORDER — OXYTOCIN/0.9 % SODIUM CHLORIDE 30/500 ML
0-20 PLASTIC BAG, INJECTION (ML) INTRAVENOUS CONTINUOUS
Status: DISCONTINUED | OUTPATIENT
Start: 2023-01-17 | End: 2023-01-18

## 2023-01-17 RX ORDER — BUPIVACAINE HYDROCHLORIDE 2.5 MG/ML
INJECTION, SOLUTION EPIDURAL; INFILTRATION; INTRACAUDAL PRN
Status: DISCONTINUED | OUTPATIENT
Start: 2023-01-17 | End: 2023-01-18

## 2023-01-17 RX ORDER — ONDANSETRON 2 MG/ML
INJECTION INTRAMUSCULAR; INTRAVENOUS
Status: COMPLETED
Start: 2023-01-17 | End: 2023-01-17

## 2023-01-17 RX ORDER — EPHEDRINE SULFATE/0.9% NACL/PF 50 MG/10ML
10 SYRINGE (ML) INTRAVENOUS
Status: DISCONTINUED | OUTPATIENT
Start: 2023-01-17 | End: 2023-01-18

## 2023-01-17 RX ORDER — ONDANSETRON 2 MG/ML
4 INJECTION INTRAMUSCULAR; INTRAVENOUS EVERY 6 HOURS PRN
Status: DISCONTINUED | OUTPATIENT
Start: 2023-01-17 | End: 2023-01-18

## 2023-01-17 RX ORDER — OXYTOCIN-SODIUM CHLORIDE 0.9% IV SOLN 30 UNIT/500ML 30-0.9/5 UT/ML-%
0-334 SOLUTION INTRAVENOUS CONTINUOUS
Status: DISCONTINUED | OUTPATIENT
Start: 2023-01-17 | End: 2023-01-18

## 2023-01-17 RX ORDER — OXYTOCIN/0.9 % SODIUM CHLORIDE 30/500 ML
0-25 PLASTIC BAG, INJECTION (ML) INTRAVENOUS CONTINUOUS
Status: DISCONTINUED | OUTPATIENT
Start: 2023-01-17 | End: 2023-01-17

## 2023-01-17 RX ORDER — OXYTOCIN 10 [USP'U]/ML
10 INJECTION, SOLUTION INTRAMUSCULAR; INTRAVENOUS
Status: DISCONTINUED | OUTPATIENT
Start: 2023-01-17 | End: 2023-01-18

## 2023-01-17 RX ORDER — EPHEDRINE SULFATE/0.9% NACL/PF 50 MG/10ML
5 SYRINGE (ML) INTRAVENOUS
Status: DISCONTINUED | OUTPATIENT
Start: 2023-01-17 | End: 2023-01-18

## 2023-01-17 RX ORDER — SODIUM CHLORIDE, SODIUM LACTATE, POTASSIUM CHLORIDE, CALCIUM CHLORIDE 600; 310; 30; 20 MG/100ML; MG/100ML; MG/100ML; MG/100ML
INJECTION, SOLUTION INTRAVENOUS CONTINUOUS
Status: DISCONTINUED | OUTPATIENT
Start: 2023-01-17 | End: 2023-01-18

## 2023-01-17 RX ORDER — OXYTOCIN/0.9 % SODIUM CHLORIDE 30/500 ML
0-8 PLASTIC BAG, INJECTION (ML) INTRAVENOUS CONTINUOUS
Status: DISCONTINUED | OUTPATIENT
Start: 2023-01-17 | End: 2023-01-17

## 2023-01-17 RX ADMIN — Medication 12 ML/HR: at 21:28

## 2023-01-17 RX ADMIN — BUPIVACAINE HYDROCHLORIDE 5 ML: 2.5 INJECTION, SOLUTION EPIDURAL; INFILTRATION; INTRACAUDAL at 14:19

## 2023-01-17 RX ADMIN — BUPIVACAINE HYDROCHLORIDE 5 ML: 2.5 INJECTION, SOLUTION EPIDURAL; INFILTRATION; INTRACAUDAL at 14:23

## 2023-01-17 RX ADMIN — ONDANSETRON 4 MG: 2 INJECTION INTRAMUSCULAR; INTRAVENOUS at 07:10

## 2023-01-17 RX ADMIN — OXYTOCIN-SODIUM CHLORIDE 0.9% IV SOLN 30 UNIT/500ML 2 MILLI-UNITS/MIN: 30-0.9/5 SOLUTION at 11:37

## 2023-01-17 RX ADMIN — SODIUM CHLORIDE, POTASSIUM CHLORIDE, SODIUM LACTATE AND CALCIUM CHLORIDE: 600; 310; 30; 20 INJECTION, SOLUTION INTRAVENOUS at 14:01

## 2023-01-17 RX ADMIN — Medication 12 ML/HR: at 16:20

## 2023-01-17 RX ADMIN — MISOPROSTOL 25 MCG: 100 TABLET ORAL at 07:04

## 2023-01-17 RX ADMIN — SODIUM CHLORIDE, POTASSIUM CHLORIDE, SODIUM LACTATE AND CALCIUM CHLORIDE: 600; 310; 30; 20 INJECTION, SOLUTION INTRAVENOUS at 07:00

## 2023-01-17 SDOH — SOCIAL STABILITY: SOCIAL NETWORK: SUPPORT SYSTEMS: CHILDREN;PARENT;FAMILY MEMBERS;FRIENDS;SIGNIFICANT OTHER

## 2023-01-17 SDOH — HEALTH STABILITY: GENERAL
BECAUSE OF A PHYSICAL, MENTAL, OR EMOTIONAL CONDITION, DO YOU HAVE SERIOUS DIFFICULTY CONCENTRATING, REMEMBERING OR MAKING DECISIONS?: NO

## 2023-01-17 SDOH — HEALTH STABILITY: PHYSICAL HEALTH: DO YOU HAVE SERIOUS DIFFICULTY WALKING OR CLIMBING STAIRS?: NO

## 2023-01-17 SDOH — ECONOMIC STABILITY: HOUSING INSECURITY: WHAT IS YOUR LIVING SITUATION TODAY?: SPOUSE

## 2023-01-17 SDOH — ECONOMIC STABILITY: TRANSPORTATION INSECURITY
IN THE PAST 12 MONTHS, HAS THE LACK OF TRANSPORTATION KEPT YOU FROM MEDICAL APPOINTMENTS OR FROM GETTING MEDICATIONS?: NO

## 2023-01-17 SDOH — HEALTH STABILITY: GENERAL: BECAUSE OF A PHYSICAL, MENTAL, OR EMOTIONAL CONDITION, DO YOU HAVE DIFFICULTY DOING ERRANDS ALONE?: NO

## 2023-01-17 SDOH — ECONOMIC STABILITY: HOUSING INSECURITY: ARE YOU WORRIED ABOUT LOSING YOUR HOUSING?: NO

## 2023-01-17 SDOH — ECONOMIC STABILITY: FOOD INSECURITY: HOW OFTEN IN THE PAST 12 MONTHS WERE YOU WORRIED OR STRESSED ABOUT HAVING ENOUGH MONEY TO BUY NUTRITIOUS MEALS?: NEVER

## 2023-01-17 SDOH — ECONOMIC STABILITY: HOUSING INSECURITY: WHAT IS YOUR LIVING SITUATION TODAY?: HOUSE

## 2023-01-17 SDOH — ECONOMIC STABILITY: TRANSPORTATION INSECURITY
IN THE PAST 12 MONTHS, HAS LACK OF TRANSPORTATION KEPT YOU FROM MEETINGS, WORK, OR FROM GETTING THINGS NEEDED FOR DAILY LIVING?: NO

## 2023-01-17 SDOH — SOCIAL STABILITY: SOCIAL NETWORK
HOW OFTEN DO YOU SEE OR TALK TO PEOPLE THAT YOU CARE ABOUT AND FEEL CLOSE TO? (FOR EXAMPLE: TALKING TO FRIENDS ON THE PHONE, VISITING FRIENDS OR FAMILY, GOING TO CHURCH OR CLUB MEETINGS): 5 OR MORE TIMES A WEEK

## 2023-01-17 SDOH — HEALTH STABILITY: PHYSICAL HEALTH: DO YOU HAVE DIFFICULTY DRESSING OR BATHING?: NO

## 2023-01-17 SDOH — ECONOMIC STABILITY: GENERAL

## 2023-01-17 ASSESSMENT — LIFESTYLE VARIABLES
ALCOHOL_USE_STATUS: NO OR LOW RISK WITH VALIDATED TOOL
HOW MANY STANDARD DRINKS CONTAINING ALCOHOL DO YOU HAVE ON A TYPICAL DAY: 0,1 OR 2
AUDIT-C TOTAL SCORE: 0
HOW OFTEN DO YOU HAVE A DRINK CONTAINING ALCOHOL: NEVER
HOW OFTEN DO YOU HAVE 6 OR MORE DRINKS ON ONE OCCASION: NEVER

## 2023-01-17 ASSESSMENT — ACTIVITIES OF DAILY LIVING (ADL)
ADL_SHORT_OF_BREATH: NO
ADL_SCORE: 12
RECENT_DECLINE_ADL: NO
ADL_BEFORE_ADMISSION: INDEPENDENT

## 2023-01-17 ASSESSMENT — PAIN SCALES - GENERAL: PAINLEVEL_OUTOF10: 0

## 2023-01-18 LAB
GLUCOSE BLDC GLUCOMTR-MCNC: 100 MG/DL (ref 70–99)
GLUCOSE BLDC GLUCOMTR-MCNC: 104 MG/DL (ref 70–99)
GLUCOSE BLDC GLUCOMTR-MCNC: 112 MG/DL (ref 70–99)
GLUCOSE BLDC GLUCOMTR-MCNC: 120 MG/DL (ref 70–99)
GLUCOSE BLDC GLUCOMTR-MCNC: 123 MG/DL (ref 70–99)
GLUCOSE BLDC GLUCOMTR-MCNC: 129 MG/DL (ref 70–99)
GLUCOSE BLDC GLUCOMTR-MCNC: 131 MG/DL (ref 70–99)
GLUCOSE BLDC GLUCOMTR-MCNC: 144 MG/DL (ref 70–99)
GLUCOSE BLDC GLUCOMTR-MCNC: 85 MG/DL (ref 70–99)
GLUCOSE BLDC GLUCOMTR-MCNC: 98 MG/DL (ref 70–99)
GLUCOSE BLDC GLUCOMTR-MCNC: 98 MG/DL (ref 70–99)
GLUCOSE BLDC GLUCOMTR-MCNC: 99 MG/DL (ref 70–99)

## 2023-01-18 PROCEDURE — 88307 TISSUE EXAM BY PATHOLOGIST: CPT | Performed by: OBSTETRICS & GYNECOLOGY

## 2023-01-18 PROCEDURE — 10002807 HB RX 258

## 2023-01-18 PROCEDURE — 13000001 HB PHASE II RECOVERY EA 30 MINUTES

## 2023-01-18 PROCEDURE — 10002800 HB RX 250 W HCPCS

## 2023-01-18 PROCEDURE — 10002803 HB RX 637

## 2023-01-18 PROCEDURE — 10004651 HB RX, NO CHARGE ITEM

## 2023-01-18 PROCEDURE — 0UQKXZZ REPAIR HYMEN, EXTERNAL APPROACH: ICD-10-PCS | Performed by: OBSTETRICS & GYNECOLOGY

## 2023-01-18 PROCEDURE — 13003251 HB VAGINAL DELIVERY HIGH RISK

## 2023-01-18 PROCEDURE — 10002801 HB RX 250 W/O HCPCS

## 2023-01-18 PROCEDURE — 10002800 HB RX 250 W HCPCS: Performed by: STUDENT IN AN ORGANIZED HEALTH CARE EDUCATION/TRAINING PROGRAM

## 2023-01-18 PROCEDURE — 10000003 HB ROOM CHARGE WOMEN'S HEALTH

## 2023-01-18 PROCEDURE — 13001455 HB PERINATAL CARE HIGH RISK

## 2023-01-18 RX ORDER — CALCIUM CARBONATE 500 MG/1
500 TABLET, CHEWABLE ORAL EVERY 4 HOURS PRN
Status: DISCONTINUED | OUTPATIENT
Start: 2023-01-18 | End: 2023-01-20 | Stop reason: HOSPADM

## 2023-01-18 RX ORDER — OXYTOCIN/0.9 % SODIUM CHLORIDE 30/500 ML
0-30 PLASTIC BAG, INJECTION (ML) INTRAVENOUS CONTINUOUS
Status: DISCONTINUED | OUTPATIENT
Start: 2023-01-18 | End: 2023-01-18

## 2023-01-18 RX ORDER — MISOPROSTOL 200 UG/1
TABLET ORAL
Status: COMPLETED
Start: 2023-01-18 | End: 2023-01-18

## 2023-01-18 RX ORDER — NICOTINE POLACRILEX 4 MG
15 LOZENGE BUCCAL PRN
Status: DISCONTINUED | OUTPATIENT
Start: 2023-01-18 | End: 2023-01-18

## 2023-01-18 RX ORDER — DEXTROSE MONOHYDRATE 25 G/50ML
12.5 INJECTION, SOLUTION INTRAVENOUS PRN
Status: DISCONTINUED | OUTPATIENT
Start: 2023-01-18 | End: 2023-01-20 | Stop reason: HOSPADM

## 2023-01-18 RX ORDER — VITAMIN A, VITAMIN C, VITAMIN D-3, VITAMIN E, VITAMIN B-1, VITAMIN B-2, NIACIN, VITAMIN B-6, CALCIUM, IRON, ZINC, COPPER 4000; 120; 400; 22; 1.84; 3; 20; 10; 1; 12; 200; 27; 25; 2 [IU]/1; MG/1; [IU]/1; MG/1; MG/1; MG/1; MG/1; MG/1; MG/1; UG/1; MG/1; MG/1; MG/1; MG/1
1 TABLET ORAL DAILY
Status: DISCONTINUED | OUTPATIENT
Start: 2023-01-18 | End: 2023-01-20 | Stop reason: HOSPADM

## 2023-01-18 RX ORDER — DEXTROSE MONOHYDRATE 25 G/50ML
12.5 INJECTION, SOLUTION INTRAVENOUS PRN
Status: DISCONTINUED | OUTPATIENT
Start: 2023-01-18 | End: 2023-01-18

## 2023-01-18 RX ORDER — DEXTROSE MONOHYDRATE 25 G/50ML
25 INJECTION, SOLUTION INTRAVENOUS PRN
Status: DISCONTINUED | OUTPATIENT
Start: 2023-01-18 | End: 2023-01-20 | Stop reason: HOSPADM

## 2023-01-18 RX ORDER — MISOPROSTOL 200 UG/1
1000 TABLET ORAL ONCE
Status: COMPLETED | OUTPATIENT
Start: 2023-01-18 | End: 2023-01-18

## 2023-01-18 RX ORDER — AMOXICILLIN 250 MG
2 CAPSULE ORAL 2 TIMES DAILY PRN
Status: DISCONTINUED | OUTPATIENT
Start: 2023-01-18 | End: 2023-01-20 | Stop reason: HOSPADM

## 2023-01-18 RX ORDER — ONDANSETRON 2 MG/ML
4 INJECTION INTRAMUSCULAR; INTRAVENOUS EVERY 6 HOURS PRN
Status: DISCONTINUED | OUTPATIENT
Start: 2023-01-18 | End: 2023-01-20 | Stop reason: HOSPADM

## 2023-01-18 RX ORDER — ACETAMINOPHEN 500 MG
TABLET ORAL
Status: COMPLETED
Start: 2023-01-18 | End: 2023-01-18

## 2023-01-18 RX ORDER — SIMETHICONE 125 MG
125 TABLET,CHEWABLE ORAL EVERY 4 HOURS PRN
Status: DISCONTINUED | OUTPATIENT
Start: 2023-01-18 | End: 2023-01-20 | Stop reason: HOSPADM

## 2023-01-18 RX ORDER — BISACODYL 10 MG
10 SUPPOSITORY, RECTAL RECTAL DAILY PRN
Status: DISCONTINUED | OUTPATIENT
Start: 2023-01-18 | End: 2023-01-20 | Stop reason: HOSPADM

## 2023-01-18 RX ORDER — NICOTINE POLACRILEX 4 MG
15 LOZENGE BUCCAL PRN
Status: DISCONTINUED | OUTPATIENT
Start: 2023-01-18 | End: 2023-01-20 | Stop reason: HOSPADM

## 2023-01-18 RX ORDER — HEPARIN SOD,PORK IN 0.45% NACL 25000/500
INTRAVENOUS SOLUTION INTRAVENOUS
Status: COMPLETED
Start: 2023-01-18 | End: 2023-01-18

## 2023-01-18 RX ORDER — ACETAMINOPHEN 500 MG
1000 TABLET ORAL ONCE
Status: COMPLETED | OUTPATIENT
Start: 2023-01-18 | End: 2023-01-18

## 2023-01-18 RX ORDER — DIPHENHYDRAMINE HYDROCHLORIDE 50 MG/ML
12.5 INJECTION INTRAMUSCULAR; INTRAVENOUS ONCE
Status: COMPLETED | OUTPATIENT
Start: 2023-01-18 | End: 2023-01-18

## 2023-01-18 RX ORDER — AMPICILLIN 2 G/1
INJECTION, POWDER, FOR SOLUTION INTRAVENOUS
Status: DISPENSED
Start: 2023-01-18 | End: 2023-01-18

## 2023-01-18 RX ORDER — HYDROCORTISONE ACETATE PRAMOXINE HCL 2.5; 1 G/100G; G/100G
CREAM TOPICAL 3 TIMES DAILY PRN
Status: DISCONTINUED | OUTPATIENT
Start: 2023-01-18 | End: 2023-01-20 | Stop reason: HOSPADM

## 2023-01-18 RX ORDER — IBUPROFEN 600 MG/1
600 TABLET ORAL EVERY 6 HOURS PRN
Status: DISCONTINUED | OUTPATIENT
Start: 2023-01-18 | End: 2023-01-20 | Stop reason: HOSPADM

## 2023-01-18 RX ORDER — ACETAMINOPHEN 325 MG/1
650 TABLET ORAL EVERY 6 HOURS PRN
Status: DISCONTINUED | OUTPATIENT
Start: 2023-01-18 | End: 2023-01-20 | Stop reason: HOSPADM

## 2023-01-18 RX ORDER — OXYTOCIN-SODIUM CHLORIDE 0.9% IV SOLN 30 UNIT/500ML 30-0.9/5 UT/ML-%
0-334 SOLUTION INTRAVENOUS CONTINUOUS
Status: DISCONTINUED | OUTPATIENT
Start: 2023-01-18 | End: 2023-01-20 | Stop reason: HOSPADM

## 2023-01-18 RX ORDER — DEXTROSE, SODIUM CHLORIDE, SODIUM LACTATE, POTASSIUM CHLORIDE, AND CALCIUM CHLORIDE 5; .6; .31; .03; .02 G/100ML; G/100ML; G/100ML; G/100ML; G/100ML
INJECTION, SOLUTION INTRAVENOUS CONTINUOUS PRN
Status: DISCONTINUED | OUTPATIENT
Start: 2023-01-18 | End: 2023-01-18

## 2023-01-18 RX ORDER — SODIUM CHLORIDE 9 MG/ML
INJECTION, SOLUTION INTRAVENOUS
Status: DISPENSED
Start: 2023-01-18 | End: 2023-01-18

## 2023-01-18 RX ORDER — NICOTINE POLACRILEX 4 MG
30 LOZENGE BUCCAL PRN
Status: DISCONTINUED | OUTPATIENT
Start: 2023-01-18 | End: 2023-01-20 | Stop reason: HOSPADM

## 2023-01-18 RX ADMIN — Medication 12 ML/HR: at 05:11

## 2023-01-18 RX ADMIN — IBUPROFEN 600 MG: 600 TABLET, FILM COATED ORAL at 21:01

## 2023-01-18 RX ADMIN — ONDANSETRON 4 MG: 2 INJECTION INTRAMUSCULAR; INTRAVENOUS at 05:35

## 2023-01-18 RX ADMIN — SODIUM CHLORIDE, SODIUM LACTATE, POTASSIUM CHLORIDE, CALCIUM CHLORIDE AND DEXTROSE MONOHYDRATE: 5; 600; 310; 30; 20 INJECTION, SOLUTION INTRAVENOUS at 09:49

## 2023-01-18 RX ADMIN — SENNOSIDES AND DOCUSATE SODIUM 2 TABLET: 50; 8.6 TABLET ORAL at 21:00

## 2023-01-18 RX ADMIN — Medication 1000 MG: at 08:56

## 2023-01-18 RX ADMIN — SIMETHICONE 125 MG: 125 TABLET, CHEWABLE ORAL at 21:00

## 2023-01-18 RX ADMIN — AMPICILLIN SODIUM 2000 MG: 2 INJECTION, POWDER, FOR SOLUTION INTRAVENOUS at 09:05

## 2023-01-18 RX ADMIN — INSULIN HUMAN 0.5 UNITS/HR: 1 INJECTION, SOLUTION INTRAVENOUS at 09:49

## 2023-01-18 RX ADMIN — DIPHENHYDRAMINE HYDROCHLORIDE 12.5 MG: 50 INJECTION, SOLUTION INTRAMUSCULAR; INTRAVENOUS at 00:17

## 2023-01-18 RX ADMIN — ACETAMINOPHEN 650 MG: 325 TABLET ORAL at 21:01

## 2023-01-18 RX ADMIN — ACETAMINOPHEN 1000 MG: 500 TABLET ORAL at 08:56

## 2023-01-18 RX ADMIN — ONDANSETRON 4 MG: 2 INJECTION INTRAMUSCULAR; INTRAVENOUS at 09:02

## 2023-01-18 RX ADMIN — MISOPROSTOL 1000 MCG: 200 TABLET ORAL at 12:56

## 2023-01-18 RX ADMIN — OXYTOCIN-SODIUM CHLORIDE 0.9% IV SOLN 30 UNIT/500ML 334 ML/HR: 30-0.9/5 SOLUTION at 12:15

## 2023-01-18 RX ADMIN — SODIUM CHLORIDE, POTASSIUM CHLORIDE, SODIUM LACTATE AND CALCIUM CHLORIDE: 600; 310; 30; 20 INJECTION, SOLUTION INTRAVENOUS at 00:41

## 2023-01-18 ASSESSMENT — PAIN SCALES - GENERAL
PAINLEVEL_OUTOF10: 0
PAINLEVEL_OUTOF10: 5
PAINLEVEL_OUTOF10: 0
PAINLEVEL_OUTOF10: 3
PAINLEVEL_OUTOF10: 0
PAINLEVEL_OUTOF10: 1
PAINLEVEL_OUTOF10: 0

## 2023-01-19 LAB
DEPRECATED RDW RBC: 45.1 FL (ref 39–50)
ERYTHROCYTE [DISTWIDTH] IN BLOOD: 14.1 % (ref 11–15)
GLUCOSE BLDC GLUCOMTR-MCNC: 105 MG/DL (ref 70–99)
GLUCOSE BLDC GLUCOMTR-MCNC: 107 MG/DL (ref 70–99)
GLUCOSE BLDC GLUCOMTR-MCNC: 135 MG/DL (ref 70–99)
GLUCOSE BLDC GLUCOMTR-MCNC: 88 MG/DL (ref 70–99)
HCT VFR BLD CALC: 37.4 % (ref 36–46.5)
HGB BLD-MCNC: 12.6 G/DL (ref 12–15.5)
MCH RBC QN AUTO: 29.8 PG (ref 26–34)
MCHC RBC AUTO-ENTMCNC: 33.7 G/DL (ref 32–36.5)
MCV RBC AUTO: 88.4 FL (ref 78–100)
NRBC BLD MANUAL-RTO: 0 /100 WBC
PLATELET # BLD AUTO: 169 K/MCL (ref 140–450)
RBC # BLD: 4.23 MIL/MCL (ref 4–5.2)
WBC # BLD: 16.4 K/MCL (ref 4.2–11)

## 2023-01-19 PROCEDURE — 10004651 HB RX, NO CHARGE ITEM

## 2023-01-19 PROCEDURE — 10000003 HB ROOM CHARGE WOMEN'S HEALTH

## 2023-01-19 PROCEDURE — 10002803 HB RX 637

## 2023-01-19 PROCEDURE — 85027 COMPLETE CBC AUTOMATED: CPT

## 2023-01-19 RX ORDER — IBUPROFEN 600 MG/1
600 TABLET ORAL EVERY 6 HOURS PRN
Qty: 30 TABLET | Refills: 0 | Status: SHIPPED | OUTPATIENT
Start: 2023-01-19

## 2023-01-19 RX ADMIN — VITAMIN A, VITAMIN C, VITAMIN D, VITAMIN E, THIAMINE, RIBOFLAVIN, NIACIN, VITAMIN B6, FOLIC ACID, VITAMIN B12, CALCIUM, IRON, ZINC, COPPER 1 TABLET: 4000; 120; 400; 22; 1.84; 3; 20; 10; 1; 12; 200; 27; 25; 2 TABLET ORAL at 09:30

## 2023-01-19 RX ADMIN — IBUPROFEN 600 MG: 600 TABLET, FILM COATED ORAL at 09:30

## 2023-01-19 RX ADMIN — IBUPROFEN 600 MG: 600 TABLET, FILM COATED ORAL at 15:11

## 2023-01-19 RX ADMIN — SIMETHICONE 125 MG: 125 TABLET, CHEWABLE ORAL at 20:50

## 2023-01-19 RX ADMIN — HYDROCORTISONE ACETATE PRAMOXINE HCL: 2.5; 1 CREAM TOPICAL at 15:55

## 2023-01-19 RX ADMIN — IBUPROFEN 600 MG: 600 TABLET, FILM COATED ORAL at 03:12

## 2023-01-19 RX ADMIN — ACETAMINOPHEN 650 MG: 325 TABLET ORAL at 03:12

## 2023-01-19 RX ADMIN — IBUPROFEN 600 MG: 600 TABLET, FILM COATED ORAL at 20:51

## 2023-01-19 RX ADMIN — ACETAMINOPHEN 650 MG: 325 TABLET ORAL at 09:30

## 2023-01-19 RX ADMIN — ACETAMINOPHEN 650 MG: 325 TABLET ORAL at 15:11

## 2023-01-19 RX ADMIN — SENNOSIDES AND DOCUSATE SODIUM 2 TABLET: 50; 8.6 TABLET ORAL at 09:34

## 2023-01-19 RX ADMIN — ACETAMINOPHEN 650 MG: 325 TABLET ORAL at 20:51

## 2023-01-19 RX ADMIN — SENNOSIDES AND DOCUSATE SODIUM 2 TABLET: 50; 8.6 TABLET ORAL at 20:51

## 2023-01-19 ASSESSMENT — PAIN SCALES - GENERAL
PAINLEVEL_OUTOF10: 3
PAINLEVEL_OUTOF10: 3
PAINLEVEL_OUTOF10: 2
PAINLEVEL_OUTOF10: 2
PAINLEVEL_OUTOF10: 3
PAINLEVEL_OUTOF10: 3
PAINLEVEL_OUTOF10: 2
PAINLEVEL_OUTOF10: 3

## 2023-01-20 VITALS
BODY MASS INDEX: 38.61 KG/M2 | SYSTOLIC BLOOD PRESSURE: 106 MMHG | HEIGHT: 67 IN | WEIGHT: 246 LBS | HEART RATE: 70 BPM | TEMPERATURE: 97.7 F | OXYGEN SATURATION: 97 % | DIASTOLIC BLOOD PRESSURE: 64 MMHG | RESPIRATION RATE: 16 BRPM

## 2023-01-20 LAB
GLUCOSE BLDC GLUCOMTR-MCNC: 83 MG/DL (ref 70–99)
GLUCOSE BLDC GLUCOMTR-MCNC: 99 MG/DL (ref 70–99)

## 2023-01-20 PROCEDURE — 10004651 HB RX, NO CHARGE ITEM

## 2023-01-20 PROCEDURE — 10002803 HB RX 637

## 2023-01-20 RX ORDER — ACETAMINOPHEN 500 MG
1000 TABLET ORAL EVERY 8 HOURS PRN
Qty: 10 TABLET | Refills: 0 | Status: SHIPPED
Start: 2023-01-20

## 2023-01-20 RX ADMIN — IBUPROFEN 600 MG: 600 TABLET, FILM COATED ORAL at 13:01

## 2023-01-20 RX ADMIN — SIMETHICONE 125 MG: 125 TABLET, CHEWABLE ORAL at 03:29

## 2023-01-20 RX ADMIN — VITAMIN A, VITAMIN C, VITAMIN D, VITAMIN E, THIAMINE, RIBOFLAVIN, NIACIN, VITAMIN B6, FOLIC ACID, VITAMIN B12, CALCIUM, IRON, ZINC, COPPER 1 TABLET: 4000; 120; 400; 22; 1.84; 3; 20; 10; 1; 12; 200; 27; 25; 2 TABLET ORAL at 08:03

## 2023-01-20 RX ADMIN — ACETAMINOPHEN 650 MG: 325 TABLET ORAL at 03:30

## 2023-01-20 RX ADMIN — ACETAMINOPHEN 650 MG: 325 TABLET ORAL at 13:01

## 2023-01-20 RX ADMIN — IBUPROFEN 600 MG: 600 TABLET, FILM COATED ORAL at 03:30

## 2023-01-20 ASSESSMENT — PAIN SCALES - GENERAL
PAINLEVEL_OUTOF10: 5
PAINLEVEL_OUTOF10: 3
PAINLEVEL_OUTOF10: 1
PAINLEVEL_OUTOF10: 5

## 2023-01-23 LAB
ASR DISCLAIMER: NORMAL
CASE RPRT: NORMAL
CLINICAL INFO: NORMAL
PATH REPORT.FINAL DX SPEC: NORMAL
PATH REPORT.GROSS SPEC: NORMAL

## 2023-01-31 RX ORDER — PERPHENAZINE 16 MG/1
TABLET, FILM COATED ORAL
Qty: 400 STRIP | Refills: 0 | Status: SHIPPED | OUTPATIENT
Start: 2023-01-31

## 2023-02-17 ENCOUNTER — OFFICE VISIT (OUTPATIENT)
Dept: INTERNAL MEDICINE CLINIC | Facility: CLINIC | Age: 33
End: 2023-02-17
Payer: COMMERCIAL

## 2023-02-17 VITALS
DIASTOLIC BLOOD PRESSURE: 72 MMHG | WEIGHT: 222 LBS | HEIGHT: 67.32 IN | BODY MASS INDEX: 34.44 KG/M2 | HEART RATE: 75 BPM | SYSTOLIC BLOOD PRESSURE: 118 MMHG | OXYGEN SATURATION: 97 %

## 2023-02-17 DIAGNOSIS — M79.2 NEUROPATHIC PAIN: Primary | ICD-10-CM

## 2023-02-17 DIAGNOSIS — E11.9 TYPE 2 DIABETES MELLITUS WITHOUT COMPLICATION, WITHOUT LONG-TERM CURRENT USE OF INSULIN (HCC): ICD-10-CM

## 2023-02-17 PROCEDURE — 3074F SYST BP LT 130 MM HG: CPT | Performed by: INTERNAL MEDICINE

## 2023-02-17 PROCEDURE — 3078F DIAST BP <80 MM HG: CPT | Performed by: INTERNAL MEDICINE

## 2023-02-17 PROCEDURE — 99214 OFFICE O/P EST MOD 30 MIN: CPT | Performed by: INTERNAL MEDICINE

## 2023-02-17 PROCEDURE — 3008F BODY MASS INDEX DOCD: CPT | Performed by: INTERNAL MEDICINE

## 2023-04-21 ENCOUNTER — PATIENT MESSAGE (OUTPATIENT)
Dept: ENDOCRINOLOGY CLINIC | Facility: CLINIC | Age: 33
End: 2023-04-21

## 2023-04-21 DIAGNOSIS — E11.9 CONTROLLED TYPE 2 DIABETES MELLITUS WITHOUT COMPLICATION, WITHOUT LONG-TERM CURRENT USE OF INSULIN (HCC): Primary | ICD-10-CM

## 2023-04-25 NOTE — TELEPHONE ENCOUNTER
Per 12/19/22 VV, patient were to stop DM medications post delivery. RN sent follow up questions regarding symptoms. Will route message to APRN to advise on request for sooner appointment, lab, and symptoms.

## 2023-05-23 LAB — HEMOGLOBIN A1C: 6.1 % OF TOTAL HGB

## 2023-05-26 ENCOUNTER — TELEMEDICINE (OUTPATIENT)
Dept: ENDOCRINOLOGY CLINIC | Facility: CLINIC | Age: 33
End: 2023-05-26

## 2023-05-26 DIAGNOSIS — E78.5 HYPERLIPIDEMIA, UNSPECIFIED HYPERLIPIDEMIA TYPE: ICD-10-CM

## 2023-05-26 DIAGNOSIS — E11.9 CONTROLLED TYPE 2 DIABETES MELLITUS WITHOUT COMPLICATION, WITHOUT LONG-TERM CURRENT USE OF INSULIN (HCC): Primary | ICD-10-CM

## 2023-05-26 NOTE — PATIENT INSTRUCTIONS
A1C: 6.1% on 5/22/2023  --> increased from 5.1% on 12/16/2022    Medications:   None     - continue to follow a low carb diet   - increase activity/exercise with goal 5x weekly for at least 30 mins per day     - repeat lipid panel and A1C in 3 months - any time after 8/26/23    A1C goal:  <7.0%    Blood sugar testing:  Test your blood sugar 1 time daily   Recommended times to test: alternate with before breakfast (fasting) or 2hrs after meals     Blood sugar targets:  Before breakfast:   (preferably < 110)  2 hours after meals: <150     Call for persistent blood sugars < 75 or > 200

## 2023-08-18 ENCOUNTER — PATIENT MESSAGE (OUTPATIENT)
Dept: ENDOCRINOLOGY CLINIC | Facility: CLINIC | Age: 33
End: 2023-08-18

## 2023-08-18 LAB
CHOL/HDLC RATIO: 4.3 (CALC)
CHOLESTEROL, TOTAL: 178 MG/DL
HDL CHOLESTEROL: 41 MG/DL
HEMOGLOBIN A1C: 6.6 % OF TOTAL HGB
LDL-CHOLESTEROL: 89 MG/DL (CALC)
NON-HDL CHOLESTEROL: 137 MG/DL (CALC)
TRIGLYCERIDES: 357 MG/DL

## 2023-08-21 NOTE — TELEPHONE ENCOUNTER
From: Joseline Lucero 131  To: CYNDY Jain  Sent: 8/18/2023 9:15 PM CDT  Subject: Labs     I do not breastfeed. However, Kedar Getting been having sugars ranging, when I do check between 120-170    I have not been exercising at all, other than walks with baby, and my diet has good and bad days. I find that my streets and work load, and being a new mom is a lot to juggle. I am hoping to get back on track and get my numbers and health where it needs to be. I know we briefly spoke about something to help jumpstart weight loss. Maybe that could help be the catalyst to get me going, as I seem to do well when I have to force myself to check in.

## 2023-08-22 ENCOUNTER — TELEPHONE (OUTPATIENT)
Dept: ENDOCRINOLOGY CLINIC | Facility: CLINIC | Age: 33
End: 2023-08-22

## 2023-08-22 NOTE — TELEPHONE ENCOUNTER
Coretta Garcia,    Please see update. I have responded to pt regarding MOA of Ozempic as it should not cause low blood sugar as well as stopping it.  Please advise on any further recommendations and frequency of checking BG if she plans to move forward with Ozempic

## 2023-09-01 ENCOUNTER — TELEMEDICINE (OUTPATIENT)
Dept: ENDOCRINOLOGY CLINIC | Facility: CLINIC | Age: 33
End: 2023-09-01

## 2023-09-01 DIAGNOSIS — E11.65 TYPE 2 DIABETES MELLITUS WITH HYPERGLYCEMIA, WITHOUT LONG-TERM CURRENT USE OF INSULIN (HCC): Primary | ICD-10-CM

## 2023-09-01 DIAGNOSIS — E78.1 HYPERTRIGLYCERIDEMIA: ICD-10-CM

## 2023-09-01 RX ORDER — SEMAGLUTIDE 0.68 MG/ML
0.25 INJECTION, SOLUTION SUBCUTANEOUS WEEKLY
Qty: 6 ML | Refills: 0 | Status: SHIPPED | OUTPATIENT
Start: 2023-09-01

## 2023-09-07 ENCOUNTER — PATIENT MESSAGE (OUTPATIENT)
Dept: ENDOCRINOLOGY CLINIC | Facility: CLINIC | Age: 33
End: 2023-09-07

## 2024-06-15 ENCOUNTER — APPOINTMENT (OUTPATIENT)
Dept: GENERAL RADIOLOGY | Facility: HOSPITAL | Age: 34
End: 2024-06-15
Attending: EMERGENCY MEDICINE

## 2024-06-15 ENCOUNTER — HOSPITAL ENCOUNTER (EMERGENCY)
Facility: HOSPITAL | Age: 34
Discharge: HOME | End: 2024-06-15
Attending: EMERGENCY MEDICINE

## 2024-06-15 ENCOUNTER — APPOINTMENT (OUTPATIENT)
Dept: CT IMAGING | Facility: HOSPITAL | Age: 34
End: 2024-06-15
Attending: EMERGENCY MEDICINE

## 2024-06-15 ENCOUNTER — HOSPITAL ENCOUNTER (OUTPATIENT)
Age: 34
Discharge: ANOTHER HOSPITAL | End: 2024-06-15
Attending: STUDENT IN AN ORGANIZED HEALTH CARE EDUCATION/TRAINING PROGRAM

## 2024-06-15 VITALS
TEMPERATURE: 99 F | OXYGEN SATURATION: 98 % | SYSTOLIC BLOOD PRESSURE: 134 MMHG | DIASTOLIC BLOOD PRESSURE: 87 MMHG | HEART RATE: 91 BPM | RESPIRATION RATE: 17 BRPM

## 2024-06-15 VITALS
SYSTOLIC BLOOD PRESSURE: 128 MMHG | WEIGHT: 228 LBS | HEART RATE: 84 BPM | RESPIRATION RATE: 16 BRPM | OXYGEN SATURATION: 98 % | DIASTOLIC BLOOD PRESSURE: 86 MMHG | TEMPERATURE: 98 F | BODY MASS INDEX: 35 KG/M2

## 2024-06-15 DIAGNOSIS — R10.13 ABDOMINAL PAIN, EPIGASTRIC: Primary | ICD-10-CM

## 2024-06-15 DIAGNOSIS — R05.1 ACUTE COUGH: ICD-10-CM

## 2024-06-15 DIAGNOSIS — R10.13 EPIGASTRIC PAIN: Primary | ICD-10-CM

## 2024-06-15 DIAGNOSIS — K29.00 ACUTE GASTRITIS WITHOUT HEMORRHAGE, UNSPECIFIED GASTRITIS TYPE: ICD-10-CM

## 2024-06-15 LAB
ALBUMIN SERPL-MCNC: 4.8 G/DL (ref 3.2–4.8)
ALP LIVER SERPL-CCNC: 66 U/L
ALT SERPL-CCNC: 30 U/L
ANION GAP SERPL CALC-SCNC: 9 MMOL/L (ref 0–18)
AST SERPL-CCNC: 14 U/L (ref ?–34)
B-HCG UR QL: NEGATIVE
BASOPHILS # BLD AUTO: 0.05 X10(3) UL (ref 0–0.2)
BASOPHILS NFR BLD AUTO: 0.7 %
BILIRUB DIRECT SERPL-MCNC: 0.1 MG/DL (ref ?–0.3)
BILIRUB SERPL-MCNC: 0.5 MG/DL (ref 0.3–1.2)
BILIRUB UR QL STRIP: NEGATIVE
BUN BLD-MCNC: 8 MG/DL (ref 9–23)
BUN/CREAT SERPL: 12.5 (ref 10–20)
CALCIUM BLD-MCNC: 9.8 MG/DL (ref 8.7–10.4)
CHLORIDE SERPL-SCNC: 107 MMOL/L (ref 98–112)
CLARITY UR: CLEAR
CO2 SERPL-SCNC: 23 MMOL/L (ref 21–32)
COLOR UR: YELLOW
CREAT BLD-MCNC: 0.64 MG/DL
DEPRECATED RDW RBC AUTO: 38.9 FL (ref 35.1–46.3)
EGFRCR SERPLBLD CKD-EPI 2021: 119 ML/MIN/1.73M2 (ref 60–?)
EOSINOPHIL # BLD AUTO: 0.18 X10(3) UL (ref 0–0.7)
EOSINOPHIL NFR BLD AUTO: 2.4 %
ERYTHROCYTE [DISTWIDTH] IN BLOOD BY AUTOMATED COUNT: 12.5 % (ref 11–15)
GLUCOSE BLD-MCNC: 136 MG/DL (ref 70–99)
GLUCOSE BLDC GLUCOMTR-MCNC: 126 MG/DL (ref 70–99)
GLUCOSE UR STRIP-MCNC: NEGATIVE MG/DL
HCT VFR BLD AUTO: 45.1 %
HGB BLD-MCNC: 15.7 G/DL
HGB UR QL STRIP: NEGATIVE
IMM GRANULOCYTES # BLD AUTO: 0.05 X10(3) UL (ref 0–1)
IMM GRANULOCYTES NFR BLD: 0.7 %
KETONES UR STRIP-MCNC: NEGATIVE MG/DL
LEUKOCYTE ESTERASE UR QL STRIP: NEGATIVE
LIPASE SERPL-CCNC: 35 U/L (ref 13–75)
LYMPHOCYTES # BLD AUTO: 1.59 X10(3) UL (ref 1–4)
LYMPHOCYTES NFR BLD AUTO: 21.3 %
MCH RBC QN AUTO: 30 PG (ref 26–34)
MCHC RBC AUTO-ENTMCNC: 34.8 G/DL (ref 31–37)
MCV RBC AUTO: 86.1 FL
MONOCYTES # BLD AUTO: 0.59 X10(3) UL (ref 0.1–1)
MONOCYTES NFR BLD AUTO: 7.9 %
NEUTROPHILS # BLD AUTO: 5.02 X10 (3) UL (ref 1.5–7.7)
NEUTROPHILS # BLD AUTO: 5.02 X10(3) UL (ref 1.5–7.7)
NEUTROPHILS NFR BLD AUTO: 67 %
NITRITE UR QL STRIP: NEGATIVE
OSMOLALITY SERPL CALC.SUM OF ELEC: 288 MOSM/KG (ref 275–295)
PH UR STRIP: 5 [PH]
PLATELET # BLD AUTO: 244 10(3)UL (ref 150–450)
POTASSIUM SERPL-SCNC: 3.8 MMOL/L (ref 3.5–5.1)
PROT SERPL-MCNC: 7.5 G/DL (ref 5.7–8.2)
PROT UR STRIP-MCNC: NEGATIVE MG/DL
RBC # BLD AUTO: 5.24 X10(6)UL
S PYO AG THROAT QL IA.RAPID: NEGATIVE
SARS-COV-2 RNA RESP QL NAA+PROBE: NOT DETECTED
SODIUM SERPL-SCNC: 139 MMOL/L (ref 136–145)
SP GR UR STRIP: <=1.005
TROPONIN I SERPL HS-MCNC: <3 NG/L
UROBILINOGEN UR STRIP-ACNC: <2 MG/DL
WBC # BLD AUTO: 7.5 X10(3) UL (ref 4–11)

## 2024-06-15 PROCEDURE — 99285 EMERGENCY DEPT VISIT HI MDM: CPT

## 2024-06-15 PROCEDURE — 80048 BASIC METABOLIC PNL TOTAL CA: CPT | Performed by: EMERGENCY MEDICINE

## 2024-06-15 PROCEDURE — 74177 CT ABD & PELVIS W/CONTRAST: CPT | Performed by: EMERGENCY MEDICINE

## 2024-06-15 PROCEDURE — 83690 ASSAY OF LIPASE: CPT | Performed by: EMERGENCY MEDICINE

## 2024-06-15 PROCEDURE — 82962 GLUCOSE BLOOD TEST: CPT

## 2024-06-15 PROCEDURE — 96374 THER/PROPH/DIAG INJ IV PUSH: CPT

## 2024-06-15 PROCEDURE — 96376 TX/PRO/DX INJ SAME DRUG ADON: CPT

## 2024-06-15 PROCEDURE — 71045 X-RAY EXAM CHEST 1 VIEW: CPT | Performed by: EMERGENCY MEDICINE

## 2024-06-15 PROCEDURE — 81025 URINE PREGNANCY TEST: CPT

## 2024-06-15 PROCEDURE — 81002 URINALYSIS NONAUTO W/O SCOPE: CPT

## 2024-06-15 PROCEDURE — 84484 ASSAY OF TROPONIN QUANT: CPT | Performed by: EMERGENCY MEDICINE

## 2024-06-15 PROCEDURE — 85025 COMPLETE CBC W/AUTO DIFF WBC: CPT | Performed by: EMERGENCY MEDICINE

## 2024-06-15 PROCEDURE — 96361 HYDRATE IV INFUSION ADD-ON: CPT

## 2024-06-15 PROCEDURE — 80076 HEPATIC FUNCTION PANEL: CPT | Performed by: EMERGENCY MEDICINE

## 2024-06-15 PROCEDURE — 96375 TX/PRO/DX INJ NEW DRUG ADDON: CPT

## 2024-06-15 PROCEDURE — 99212 OFFICE O/P EST SF 10 MIN: CPT

## 2024-06-15 PROCEDURE — C9113 INJ PANTOPRAZOLE SODIUM, VIA: HCPCS | Performed by: EMERGENCY MEDICINE

## 2024-06-15 PROCEDURE — 93005 ELECTROCARDIOGRAM TRACING: CPT

## 2024-06-15 PROCEDURE — 87651 STREP A DNA AMP PROBE: CPT | Performed by: STUDENT IN AN ORGANIZED HEALTH CARE EDUCATION/TRAINING PROGRAM

## 2024-06-15 PROCEDURE — 93010 ELECTROCARDIOGRAM REPORT: CPT

## 2024-06-15 RX ORDER — MORPHINE SULFATE 4 MG/ML
4 INJECTION, SOLUTION INTRAMUSCULAR; INTRAVENOUS ONCE
Status: COMPLETED | OUTPATIENT
Start: 2024-06-15 | End: 2024-06-15

## 2024-06-15 RX ORDER — ONDANSETRON 2 MG/ML
4 INJECTION INTRAMUSCULAR; INTRAVENOUS ONCE
Status: COMPLETED | OUTPATIENT
Start: 2024-06-15 | End: 2024-06-15

## 2024-06-15 RX ORDER — PANTOPRAZOLE SODIUM 40 MG/1
40 TABLET, DELAYED RELEASE ORAL DAILY
Qty: 30 TABLET | Refills: 0 | Status: SHIPPED | OUTPATIENT
Start: 2024-06-15 | End: 2024-07-15

## 2024-06-15 NOTE — ED INITIAL ASSESSMENT (HPI)
Patient presents with epigastric pain that started today  Patient with 3 days of cough, sore throat, congestion  Denies increase in ETOH/ greasy foods/ spicy foods  - emesis, + nausea  Back aches

## 2024-06-15 NOTE — ED INITIAL ASSESSMENT (HPI)
Patient complains of stabbing epigastric pain that radiates to back and feels like a soreness between the shoulder blades. Associated with shortness of breath and reports recent cough. Was recently seen for same, negative strep and covid.

## 2024-06-15 NOTE — ED PROVIDER NOTES
Patient Seen in: [facility]      History     Chief Complaint   Patient presents with    Epigastric Pain    Nasal Congestion    Sore Throat     Stated Complaint: cough, shortness of breath, pain in back    Subjective:   HPI    33-year-old female with past medical history of previous sinus surgery and gestational diabetes for which medication was discontinued after delivery, who presents with her  with concern for 4 days of subjective fevers, cough, and nasal congestion.  She also states that yesterday she noticed pain in the abdomen pointing to the epigastric region and B/L upper quadrant as to the area of pain which she reports follows the distribution of her bra strap around her abdomen, which she reports has progressively worsened.  She denies any vomiting but has been nauseated.  She reports pain radiates to the back and is worse with deep inspiration.  She denies any urinary symptoms.    Objective:   No pertinent past medical history.            No pertinent past surgical history.              No pertinent social history.            Review of Systems    Positive for stated complaint: cough, shortness of breath, pain in back  Other systems are as noted in HPI.  Constitutional and vital signs reviewed.      All other systems reviewed and negative except as noted above.    Physical Exam     ED Triage Vitals [06/15/24 1325]   /87   Pulse 91   Resp 17   Temp 98.5 °F (36.9 °C)   Temp src Temporal   SpO2 98 %   O2 Device None (Room air)       Current Vitals:   Vital Signs  BP: 134/87  Pulse: 91  Resp: 17  Temp: 98.5 °F (36.9 °C)  Temp src: Temporal    Oxygen Therapy  SpO2: 98 %  O2 Device: None (Room air)            Physical Exam    General: Awake, alert, comfortable on room air, tolerating oral secretions, interactive, uncomfortable but not in distress  Pulmonary: Lungs CTA B, no wheezing, no conversational dyspnea, appears in pain with deep inspiration and splinting with deep inspiration as  this exacerbates her abdominal pain  GI: Abdomen soft, nondistended, very tender to palpation of the epigastric region and left upper quadrant  Neuro: symmetrical facial expressions on gross observation  HEENT: no periorbital edema or erythema  Psych: Normal mood, normal affect    ED Course     Labs Reviewed   POCT PREGNANCY URINE - Normal   RAPID STREP A - Normal   RAPID SARS-COV-2 BY PCR - Normal   [facility] POCT URINALYSIS DIPSTICK     Collected 6/15/2024 13:32       Status: Final result    0 Result Notes      Component  Ref Range & Units 6/15/24 1332   Urine Color  Yellow Yellow   Urine Clarity  Clear Clear   Specific Gravity, Urine  1.005 - 1.030 <=1.005   PH, Urine  5.0 - 8.0 5.0   Protein urine  Negative mg/dL Negative   Glucose, Urine  Negative mg/dL Negative   Ketone, Urine  Negative mg/dL Negative   Bilirubin, Urine  Negative Negative   Blood, Urine  Negative Negative   Nitrite Urine  Negative Negative   Urobilinogen urine  <2.0 mg/dL <2.0   Leukocyte esterase urine  Negative Negative   Resulting Agency [facility] ([facility])              Specimen Collected: 06/15/24 13:32 Last Resulted: 06/15/24 13:32          MDM   Patient is awake, alert, uncomfortable appearing but not in distress, she does have tenderness to palpation of the epigastric region which is concerning for an acute cholecystitis versus pancreatitis, lungs are CTAB with no wheezing with no sign of acute bronchospasm, but given cough and abdominal and back pain with reported subjective fevers concern for also possible pneumonia, given epigastric pain and back pain also consider possibility for ACS versus pericarditis or myocarditis in the setting of viral symptoms, and given limitations in the immediate care of assessing for concerns for pancreatitis and cholecystitis and for managing patient's pain, I did recommend immediate assessment in the emergency department.  Patient feels comfortable with her  driving her to the [place] emergency department  and they confirmed they will go straight there.    Of note, after patient left, lab work done by nursing prior to my assessment had returned with a negative pregnancy test, unremarkable urine dip, and negative strep and COVID testing.  I called the patient who confirmed her information, we discussed negative lab work and again reaffirmed reasons for recommendations for emergency department assessment, they are on their way to the [place] emergency department at this time.          Disposition and Plan     Clinical Impression:  1. Abdominal pain, epigastric    2. Acute cough         Disposition:  Ic to ed  6/15/2024  2:05 pm    Follow-up:  No follow-up provider specified.        Medications Prescribed:  Discharge Medication List as of 6/15/2024  2:08 PM          None

## 2024-06-15 NOTE — ED PROVIDER NOTES
Patient Seen in: [facility] Emergency Department    History     Chief Complaint   Patient presents with    Chest Pain Angina       HPI    33-year-old female with past medical history significant for sinusitis presents to the ED for evaluation of epigastric abdominal pain that is radiating to the back. Patient states that it has been coming in waves and is severe. She has had nausea without vomiting. Symptoms started yesterday but seem to have gotten worse today. She denies any fever, chills, dysuria.  She states she had a bowel movement around 3 AM.    History from [facility]:       External Records Reviewed: Patient went to urgent care earlier today.  Reviewed notes from urgent care and patient was complaining of some cough, congestion, shortness of breath, as well as epigastric pain.  She had a negative COVID, RSV, flu, strep test.      History reviewed. History reviewed. No pertinent past medical history.    History reviewed. History reviewed. No pertinent surgical history.      Medications :  (Not in a hospital admission)       Family History   Problem Relation Age of Onset    Diabetes Father     Hypertension Mother     Diabetes Mother        Smoking Status:   Social History     Socioeconomic History    Marital status:    Tobacco Use    Smoking status: Never    Smokeless tobacco: Never   Substance and Sexual Activity    Alcohol use: Yes    Drug use: Never       Constitutional and vital signs reviewed.      Social History and Family History elements reviewed from today, pertinent positives to the presenting problem noted.    Physical Exam     ED Triage Vitals [06/15/24 1442]   /80   Pulse 88   Resp 18   Temp 97.9 °F (36.6 °C)   Temp src Temporal   SpO2 98 %   O2 Device None (Room air)       Physical Exam   Constitutional: AAOx3, well nourished, uncomfortable appearing  HEENT: Normocephalic, PERRLA, MMM  CV: s1s2+, RRR, no m/r/g, normal distal pulses  Pulmonary/Chest: CTA b/l with no  rales, wheezes.  No chest wall tenderness  Abdominal: Mild epigastric tenderness without rebound or guarding.  Nondistended. Soft. Bowel sounds are normal.   Neck/Back:   :   Musculoskeletal: Normal range of motion. No deformity.   Neurological: Awake, alert. Normal reflexes. No cranial nerve deficit.    Skin: Skin is warm and dry. No rash noted. No erythema.   Psychiatric:      All measures to prevent infection transmission during my interaction with the patient were taken. The patient was already wearing a droplet mask on my arrival to the room. Personal protective equipment was worn throughout the duration of the exam.      ED Course        Labs Reviewed   BASIC METABOLIC PANEL (8) - Abnormal; Notable for the following components:       Result Value    Glucose 136 (*)     BUN 8 (*)     All other components within normal limits   POCT GLUCOSE - Abnormal; Notable for the following components:    POC Glucose  126 (*)     All other components within normal limits   TROPONIN I HIGH SENSITIVITY - Normal   HEPATIC FUNCTION PANEL (7) - Normal   LIPASE - Normal   CBC WITH DIFFERENTIAL WITH PLATELET    Narrative:     The following orders were created for panel order CBC With Differential With Platelet.                  Procedure                               Abnormality         Status                                     ---------                               -----------         ------                                     CBC W/ DIFFERENTIAL[[id number]]                              Final result                                                 Please view results for these tests on the individual orders.   RAINBOW DRAW BLUE   CBC W/ DIFFERENTIAL     My Independent Interpretation of EKG (if performed): EKG    Rate, intervals and axes as noted on EKG Report.  Rate: 73 bpm  Rhythm: Sinus Rhythm  Reading: Normal sinus rhythm, T wave inversions in lead III and aVF, normal axis and intervals, no ectopy             Monitor  Interpretation:   normal sinus rhythm as interpreted by me.      Imaging Results Available and Reviewed while in ED: CT ABDOMEN+PELVIS(CONTRAST ONLY)(CPT=74177)    Result Date: 6/15/2024  CONCLUSION:  1. Mild mural thickening involving the gastric body may be pseudo thickening from luminal underdistention, or gastritis.  If persistent unexplained epigastric pain, follow-up endoscopy recommended.  2. Mild hepatic steatosis and hepatomegaly. 3. Mild splenomegaly. 4. Multiple subcentimeter mesenteric lymph nodes.  No lymph node meeting size criteria for enlargement.  Findings are probably reactive.     Dictated by (CST): Andrzej Rees MD on 6/15/2024 at 5:55 PM     Finalized by (CST): Andrzej Rees MD on 6/15/2024 at 6:00 PM          XR CHEST AP PORTABLE  (CPT=71045)    Result Date: 6/15/2024  CONCLUSION: Normal examination.     Dictated by (CST): Andrzej Rees MD on 6/15/2024 at 4:02 PM     Finalized by (CST): Andrzej Rees MD on 6/15/2024 at 4:03 PM         ED Medications Administered:   Medications   pantoprazole (Protonix) 40 mg in sodium chloride 0.9% PF 10 mL IV push (has no administration in time range)   morphINE PF 4 MG/ML injection 4 mg (has no administration in time range)   morphINE PF 4 MG/ML injection 4 mg (4 mg Intravenous Given 6/15/24 1506)   ondansetron (Zofran) 4 MG/2ML injection 4 mg (4 mg Intravenous Given 6/15/24 1506)   sodium chloride 0.9 % IV bolus 1,000 mL (0 mL Intravenous Stopped 6/15/24 1820)   iopamidol 76% (ISOVUE-370) injection for power injector (80 mL Intravenous Given 6/15/24 1728)             MDM     Vitals:    06/15/24 1442 06/15/24 1828   BP: 132/80 122/84   Pulse: 88 86   Resp: 18 16   Temp: 97.9 °F (36.6 °C)    TempSrc: Temporal    SpO2: 98% 99%   Weight: 103.4 kg      *I personally reviewed and interpreted all ED vitals.    Independent Interpretation of Studies: I have independently reviewed patient's CT scan of the abdomen pelvis.  Patient does not have any significant  acute findings.  She may have some evidence of gastritis and fatty liver disease    Social Determinants of Health:     Procedures:      Differential/MDM/Shared Decision Making: Differential Diagnosis includes peptic ulcer disease, gastritis, pancreatitis, cholecystitis, choledocholithiasis, others.      The patient already has history of sinusitis to contribute to the complexity of this ED evaluation.           Workup is relatively unremarkable.  Labs do not have any significant abnormalities.  CT scan shows evidence of possible gastritis.  Discussed case with patient and I will start her on PPI therapy and have her follow-up with her primary care physician.  Patient and  have no other questions at this time.      Condition upon leaving the department: Stable    Disposition and Plan     Clinical Impression:  1. Epigastric pain    2. Acute gastritis without hemorrhage, unspecified gastritis type        Disposition:  Discharge    Follow-up:  Feliz Martinez MD  [facility] [address] [id number] [phone]    Call in 2 day(s)        Medications Prescribed:  Current Discharge Medication List        START taking these medications    Details   pantoprazole 40 MG Oral Tab EC Take 1 tablet (40 mg total) by mouth daily.  Qty: 30 tablet, Refills: 0

## 2024-06-17 ENCOUNTER — NURSE TRIAGE (OUTPATIENT)
Dept: INTERNAL MEDICINE CLINIC | Facility: CLINIC | Age: 34
End: 2024-06-17

## 2024-06-17 LAB
ATRIAL RATE: 73 BPM
P AXIS: -6 DEGREES
P-R INTERVAL: 134 MS
Q-T INTERVAL: 400 MS
QRS DURATION: 82 MS
QTC CALCULATION (BEZET): 440 MS
R AXIS: 1 DEGREES
T AXIS: -14 DEGREES
VENTRICULAR RATE: 73 BPM

## 2024-06-17 NOTE — TELEPHONE ENCOUNTER
Spoke to Kay confirmed she cancelled today overbooking at 3:20 PM since she has another appointment. Patient will see Dr. Martinez on previous scheduled appointment for 6/18/24 at 12:40 PM.

## 2024-06-17 NOTE — TELEPHONE ENCOUNTER
Patient has been taking Doxycycline 100 mg since yesterday. Has gastritis last stool was on 6/14/24  Pt want to be seen today Dr. Martinez.  Reason for Disposition   Vomiting and abdomen looks much more swollen than usual    Answer Assessment - Initial Assessment Questions  1. LOCATION: \"Where does it hurt?\"       Near gallbladder   2. RADIATION: \"Does the pain shoot anywhere else?\" (e.g., chest, back)      Upper center Back pain   3. ONSET: \"When did the pain begin?\" (e.g., minutes, hours or days ago)       6/13/24  4. SUDDEN: \"Gradual or sudden onset?\"        5. PATTERN \"Does the pain come and go, or is it constant?\"     - If it comes and goes: \"How long does it last?\" \"Do you have pain now?\"      (Note: Comes and goes means the pain is intermittent. It goes away completely between bouts.) comes and goes      - If constant: \"Is it getting better, staying the same, or getting worse?\"       (Note: Constant means the pain never goes away completely; most serious pain is constant and gets worse.)         6. SEVERITY: \"How bad is the pain?\"  (e.g., Scale 1-10; mild, moderate, or severe)     - MILD (1-3): Doesn't interfere with normal activities, abdomen soft and not tender to touch.      - MODERATE (4-7): Interferes with normal activities or awakens from sleep, abdomen tender to touch.      - SEVERE (8-10): Excruciating pain, doubled over, unable to do any normal activities.        6/10  7. RECURRENT SYMPTOM: \"Have you ever had this type of stomach pain before?\" If Yes, ask: \"When was the last time?\" and \"What happened that time?\"       No   8. CAUSE: \"What do you think is causing the stomach pain?\"      Gastritis  9. RELIEVING/AGGRAVATING FACTORS: \"What makes it better or worse?\" (e.g., antacids, bending or twisting motion, bowel movement)      No   10. OTHER SYMPTOMS: \"Do you have any other symptoms?\" (e.g., back pain, diarrhea, fever, urination pain, vomiting)        Vomiting/ green phlegm and nasal discharge/  cough  11. PREGNANCY: \"Is there any chance you are pregnant?\" \"When was your last menstrual period?\"        6/9/24    Protocols used: Abdominal Pain - Female-A-OH

## 2024-06-17 NOTE — TELEPHONE ENCOUNTER
Pt is calling wanting to talk to dr. Martinez. Pt mention that she went to er on 06/15 sinus and abdominal pain, but now pt states is severe pain and vomiting.      Please call and advise

## 2024-06-17 NOTE — TELEPHONE ENCOUNTER
Dr. Martinez's message he will add this patient to his schedule today after 2 PM.    Left a message to call our office as soon as possible. Patient double booked for Dr. Martinez today at 3:20 PM with Dr. Martinez's authorization.    [employer] message sent to the patient.

## 2024-06-18 ENCOUNTER — OFFICE VISIT (OUTPATIENT)
Dept: INTERNAL MEDICINE CLINIC | Facility: CLINIC | Age: 34
End: 2024-06-18

## 2024-06-18 VITALS
DIASTOLIC BLOOD PRESSURE: 80 MMHG | SYSTOLIC BLOOD PRESSURE: 124 MMHG | HEART RATE: 80 BPM | BODY MASS INDEX: 34.9 KG/M2 | HEIGHT: 67.32 IN | OXYGEN SATURATION: 98 % | WEIGHT: 225 LBS

## 2024-06-18 DIAGNOSIS — E11.9 TYPE 2 DIABETES MELLITUS WITHOUT COMPLICATION, WITHOUT LONG-TERM CURRENT USE OF INSULIN (HCC): ICD-10-CM

## 2024-06-18 DIAGNOSIS — R10.13 EPIGASTRIC PAIN: Primary | ICD-10-CM

## 2024-06-18 DIAGNOSIS — K76.0 FATTY LIVER: ICD-10-CM

## 2024-06-18 PROCEDURE — 3079F DIAST BP 80-89 MM HG: CPT | Performed by: INTERNAL MEDICINE

## 2024-06-18 PROCEDURE — 3008F BODY MASS INDEX DOCD: CPT | Performed by: INTERNAL MEDICINE

## 2024-06-18 PROCEDURE — 3074F SYST BP LT 130 MM HG: CPT | Performed by: INTERNAL MEDICINE

## 2024-06-18 PROCEDURE — 99214 OFFICE O/P EST MOD 30 MIN: CPT | Performed by: INTERNAL MEDICINE

## 2024-06-18 NOTE — PROGRESS NOTES
Kay Garcia female 33 year old         Chief Complaint   Patient presents with    ER F/U     Severe abdominal pain    Sinus Problem     Was in ER  for  abd pain -  epigastric area radiation to her back nothing  would  make it better  - or worse -  didn't take anything from an over-the-counter standpoint.  Had CT scan which suggested gastritis and fatty liver- nl labs  -     Pain described as ache 4/10  then  would  be  severe  10/10 is now better today.    Got saline morphine zofran   panpropazole        Today feels  better    yesterday  was  still in pain  -   not  eating  a lot today   threw up  yesterday am and  today am  had nauseated  - now on doxy  9 no pain today -          No new meds  never took ozempic after had gestational diabetes.      Was  given  doxycycline  since  Sunday - for sinus  sxs seems to be tolerating it okay.    Had  BM today  nl      Preg test  neg      No history of cholecystitis    Patient Active Problem List   Diagnosis    Other chronic sinusitis          Current Outpatient Medications on File Prior to Visit   Medication Sig Dispense Refill    pantoprazole 40 MG Oral Tab EC Take 1 tablet (40 mg total) by mouth daily. 30 tablet 0     No current facility-administered medications on file prior to visit.          Vitals:    06/18/24 1252   BP: 124/80   Pulse: 80   VITALSBody mass index is 34.91 kg/m².    Pertinent findings on the physical exam; no distress has  congested  sounding nose and  wet  cough       Abd exam  nl nontender      Kay was seen today for er f/u and sinus problem.    Diagnoses and all orders for this visit:    Epigastric pain  -     GASTRO - INTERNAL; Future    Fatty liver    Type 2 diabetes mellitus without complication, without long-term current use of insulin (HCC)  -     Hemoglobin A1C; Future  -     Microalb/Creat Ratio, Random Urine; Future  -     Comp Metabolic Panel (14); Future  -     Lipid Panel; Future       Discussed dif  dx  PUD  - gastric   (  perf  )  vs  GB  dz  -  now  improving       Cpm   bland diet  for  3 days        Will need follow-up on diabetes labs, needs to see ophthalmology            This note was prepared using Dragon [employer] voice recognition dictation software and as a result, errors may occur. When identified, these errors have been corrected. While every attempt is made to correct errors during dictation, discrepancies may still exist

## 2024-06-20 ENCOUNTER — PATIENT MESSAGE (OUTPATIENT)
Dept: INTERNAL MEDICINE CLINIC | Facility: CLINIC | Age: 34
End: 2024-06-20

## 2024-06-20 NOTE — TELEPHONE ENCOUNTER
From: Kay Garcia  To: Feliz Martinez  Sent: 6/20/2024 11:52 AM CDT  Subject: Gastritis     Dr. Martinez    I received your message this morning. Thank you for following up. Sorry I hadn’t had a moment to reach out yesterday.     I am feeling a lot better and went back to work yesterday. I have no pain currently , just dealing with the end of the sinus infection at this time. I’m continuing the BRAT diet and have also had salad and salmon without problem.

## 2024-06-21 NOTE — TELEPHONE ENCOUNTER
Spoke to Kay informed instructions from Dr. Martinez to continue the BRAT diet and stop the antibiotic for the weekend. Patient agreed to the plan and voiced understanding.

## 2024-06-21 NOTE — TELEPHONE ENCOUNTER
Spoke to Kay who stated she had vomited x 1 today. Patient had eaten chicken last night. Patient stated only has abdominal pain when needs to vomit. Patient denied current abdominal pain or nausea. Patient continues to take doxycycline but was asymptomatic yesterday taking the antibiotic.    Please advise.

## 2025-04-15 ENCOUNTER — PATIENT MESSAGE (OUTPATIENT)
Age: 35
End: 2025-04-15

## 2025-04-21 ENCOUNTER — OFFICE VISIT (OUTPATIENT)
Age: 35
End: 2025-04-21
Payer: COMMERCIAL

## 2025-04-21 ENCOUNTER — LAB ENCOUNTER (OUTPATIENT)
Dept: LAB | Age: 35
End: 2025-04-21
Attending: INTERNAL MEDICINE
Payer: COMMERCIAL

## 2025-04-21 VITALS
SYSTOLIC BLOOD PRESSURE: 110 MMHG | BODY MASS INDEX: 35.52 KG/M2 | OXYGEN SATURATION: 98 % | HEART RATE: 81 BPM | WEIGHT: 229 LBS | DIASTOLIC BLOOD PRESSURE: 78 MMHG | HEIGHT: 67.32 IN

## 2025-04-21 DIAGNOSIS — R42 VERTIGO: Primary | ICD-10-CM

## 2025-04-21 DIAGNOSIS — E11.9 TYPE 2 DIABETES MELLITUS WITHOUT COMPLICATION, WITHOUT LONG-TERM CURRENT USE OF INSULIN (HCC): ICD-10-CM

## 2025-04-21 DIAGNOSIS — G44.52 NEW DAILY PERSISTENT HEADACHE: ICD-10-CM

## 2025-04-21 DIAGNOSIS — R42 VERTIGO: ICD-10-CM

## 2025-04-21 DIAGNOSIS — R53.83 OTHER FATIGUE: ICD-10-CM

## 2025-04-21 LAB
B-HCG UR QL: NEGATIVE
TSI SER-ACNC: 1.34 UIU/ML (ref 0.55–4.78)

## 2025-04-21 PROCEDURE — 3061F NEG MICROALBUMINURIA REV: CPT | Performed by: INTERNAL MEDICINE

## 2025-04-21 PROCEDURE — 83036 HEMOGLOBIN GLYCOSYLATED A1C: CPT | Performed by: INTERNAL MEDICINE

## 2025-04-21 PROCEDURE — 80061 LIPID PANEL: CPT | Performed by: INTERNAL MEDICINE

## 2025-04-21 PROCEDURE — 84443 ASSAY THYROID STIM HORMONE: CPT | Performed by: INTERNAL MEDICINE

## 2025-04-21 PROCEDURE — 82570 ASSAY OF URINE CREATININE: CPT | Performed by: INTERNAL MEDICINE

## 2025-04-21 PROCEDURE — 85027 COMPLETE CBC AUTOMATED: CPT | Performed by: INTERNAL MEDICINE

## 2025-04-21 PROCEDURE — 3052F HG A1C>EQUAL 8.0%<EQUAL 9.0%: CPT | Performed by: INTERNAL MEDICINE

## 2025-04-21 PROCEDURE — 82043 UR ALBUMIN QUANTITATIVE: CPT | Performed by: INTERNAL MEDICINE

## 2025-04-21 PROCEDURE — 3074F SYST BP LT 130 MM HG: CPT | Performed by: INTERNAL MEDICINE

## 2025-04-21 PROCEDURE — 80053 COMPREHEN METABOLIC PANEL: CPT | Performed by: INTERNAL MEDICINE

## 2025-04-21 PROCEDURE — 81025 URINE PREGNANCY TEST: CPT | Performed by: INTERNAL MEDICINE

## 2025-04-21 PROCEDURE — 3008F BODY MASS INDEX DOCD: CPT | Performed by: INTERNAL MEDICINE

## 2025-04-21 PROCEDURE — 99214 OFFICE O/P EST MOD 30 MIN: CPT | Performed by: INTERNAL MEDICINE

## 2025-04-21 PROCEDURE — 3078F DIAST BP <80 MM HG: CPT | Performed by: INTERNAL MEDICINE

## 2025-04-21 NOTE — PROGRESS NOTES
The following individual(s) verbally consented to be recorded using ambient AI listening technology and understand that they can each withdraw their consent to this listening technology at any point by asking the clinician to turn off or pause the recording: YES    Patient name: Kay Garcia  Additional names:       N/A

## 2025-04-21 NOTE — TELEPHONE ENCOUNTER
Pt called to reschedule appointment, pt did not do blood and would like to get it done before appointment. Pt did request for orders to be faxed.      Orders were faxed to [phone].      Confirmation received

## 2025-04-21 NOTE — PROGRESS NOTES
Kay Garcia female 34 year old    History of Present Illness  Kay Garcia is a 34 year old female with diabetes who presents with episodes of dizziness and headaches.    She experienced a significant episode of dizziness at work on Tuesday around 3 PM, accompanied by an intense warmth throughout her body. She had not eaten all day, and after eating, she attempted to rest but experienced a severe spinning sensation, describing it as 'the whole room went like a zip.' This episode lasted about an hour and a half, during which she felt unable to move and had clammy hands. 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    She has been experiencing persistent headaches since the episode on Tuesday. The headaches are described as a pressure in the back of her head and around her eyes, and they have been constant. She has not taken any medication for these headaches except for occasional ibuprofen.    She reports waking up in a puddle of sweat and feeling nauseous from Saturday night to Thursday night last week. She notes that she had her period two weeks ago and questions if these symptoms could be hormonal. She denies being pregnant, although she acknowledges the possibility.    She has a history of diabetes but is not managing it well. She has not been taking any medications or regularly checking her blood sugar levels. She reports a blood sugar level of 153 mg/dL two hours after eating and 250 mg/dL upon waking recently. She feels exhausted and attributes it to her busy lifestyle and possibly her uncontrolled diabetes.    She experiences motion sickness, which has worsened recently, especially when driving in traffic. She also feels lethargic and physically exhausted, which she attributes to her busy schedule and possibly her diabetes. No frequent urination or excessive thirst.  Heart  is regular           Patient Active Problem List    Diagnosis Date Noted    Other chronic sinusitis 07/28/2020        Medications - Current[1]     Vitals:    04/21/25 1151   BP: 110/78   Pulse: 81   VITALSBody mass index is 35.53 kg/m².    Physical Exam  NEUROLOGICAL: Gait normal. Heel walking normal. Toe walking normal. Tandem gait normal. Strength normal. Unsteady with eyes closed.  Does not appear toxic but obvious frustration with her symptoms,    Affect is normal although complaining of stress in life    Heart is regular lungs are clear                           Kay was seen today for follow - up.    Diagnoses and all orders for this visit:    Vertigo  -     MRI BRAIN (CPT=70551); Future  -     Pregnancy Test, Urine [E]; Future    New daily persistent headache  -     MRI BRAIN (CPT=70551); Future    Type 2 diabetes mellitus without complication, without long-term current use of insulin ([facility])    Other fatigue  -     Pregnancy Test, Urine [E]; Future  -     TSH W Reflex To Free T4; Future       Assessment & Plan  Dizziness and vertigo  Acute onset of dizziness and vertigo, described as a spinning sensation, lasting about 1.5 hours. Triggered by head movements and associated with clamminess and inability to move. Differential diagnosis includes vestibular dysfunction or possible central cause. Persistent dizziness with head movements since the initial episode.  - Order MRI of the brain to rule out central causes.  - Perform blood work to assess contributing factors.    Headache  Persistent headache since the episode of dizziness, described as pressure in the back of the head and around the eyes. No prior history of frequent headaches. Possible contributing factors include uncontrolled diabetes and stress. She has not taken any medication for the headaches.  - Perform blood work to assess underlying causes.  - Consider symptomatic treatment with ibuprofen if needed.    Type 2 diabetes mellitus without  complications  Poorly controlled Type 2 diabetes mellitus. Blood glucose levels: 153 mg/dL postprandial and 250 mg/dL fasting. She is not on medication or monitoring blood glucose regularly. Symptoms of fatigue and dizziness may be related to uncontrolled diabetes. She acknowledges difficulty managing her diabetes and is open to starting medication if necessary.  - Perform blood work to assess current diabetes status, including HbA1c.  - Discuss potential need for medication management based on lab results.  - Encourage regular blood glucose monitoring.    Fatigue  Chronic fatigue, possibly related to uncontrolled diabetes, stress, and lifestyle factors. She reports physical exhaustion but not mental depression. Differential includes diabetes-related fatigue, thyroid dysfunction, or other metabolic causes. She acknowledges her busy lifestyle and stress as contributing factors.  - Perform blood work including thyroid function tests.  - Encourage lifestyle modifications to manage stress and improve energy levels.       Addendum A1c came back at 8.9-triglycerides 359    Normal LFTs normal CBC thyroid negative pregnancy test negative    This note was prepared using Dragon [employer] voice recognition dictation software and as a result, errors may occur. When identified, these errors have been corrected. While every attempt is made to correct errors during dictation, discrepancies may still exist            [1] No current outpatient medications on file.

## 2025-05-01 ENCOUNTER — TELEMEDICINE (OUTPATIENT)
Age: 35
End: 2025-05-01
Payer: COMMERCIAL

## 2025-05-01 DIAGNOSIS — G44.52 NEW DAILY PERSISTENT HEADACHE: ICD-10-CM

## 2025-05-01 DIAGNOSIS — E11.9 TYPE 2 DIABETES MELLITUS WITHOUT COMPLICATION, WITHOUT LONG-TERM CURRENT USE OF INSULIN (HCC): Primary | ICD-10-CM

## 2025-05-01 DIAGNOSIS — R42 VERTIGO: ICD-10-CM

## 2025-05-01 PROCEDURE — 99213 OFFICE O/P EST LOW 20 MIN: CPT | Performed by: INTERNAL MEDICINE

## 2025-05-02 NOTE — PROGRESS NOTES
Kay Camejo Meg Garcia female 34 year old       Chief complaint:   diabetes mellitus , vertigo, headache    She states her vertigo has gone as has her headache.  Recent A1c was elevated she states she would like to try to get things going without medication.  She states that she does not eat a lot but thinks can do better.  She expressed concerns about taking metformin.    Her A1c came back at 8.9.  She states she did a great job when she was pregnant knowing she had to eat healthy and keep her sugars controlled for the baby sick.  Now feels like she is getting more motivated to do a better job      Scheduled for an MRI for her vertigo and headache but states that since last visit though symptoms have improved.    Problem List[1]     Current Medications[2]     There were no vitals filed for this visit.VITALSThere is no height or weight on file to calculate BMI.        General:  healthy looking normal affect looks very healthy on the video today.    Neurological:  no focal deficit , no tremor            Diagnoses and all orders for this visit:    Type 2 diabetes mellitus without complication, without long-term current use of insulin (HCC)    Vertigo    New daily persistent headache       Discussed the importance of good sugar control.  Especially at her young age where complications will come without that.  For that reason I recommend to see endocrinology again.  Referral placed to see Adebayo     She also mentioned that she might be trying to get pregnant again which can be complicated.    Her vertigo and headache have improved in fact gone away for that reason I think we can cancel the MRI        This note was prepared using Dragon [employer] voice recognition dictation software and as a result, errors may occur. When identified, these errors have been corrected. While every attempt is made to correct errors during dictation, discrepancies may still exist          [1]   Patient Active Problem List  Diagnosis     Other chronic sinusitis   [2]   No current outpatient medications on file.

## 2025-06-03 ENCOUNTER — OFFICE VISIT (OUTPATIENT)
Dept: ENDOCRINOLOGY CLINIC | Facility: CLINIC | Age: 35
End: 2025-06-03
Payer: COMMERCIAL

## 2025-06-03 ENCOUNTER — TELEPHONE (OUTPATIENT)
Dept: ENDOCRINOLOGY CLINIC | Facility: CLINIC | Age: 35
End: 2025-06-03

## 2025-06-03 VITALS
HEART RATE: 73 BPM | WEIGHT: 224 LBS | HEIGHT: 67.32 IN | BODY MASS INDEX: 34.75 KG/M2 | SYSTOLIC BLOOD PRESSURE: 122 MMHG | DIASTOLIC BLOOD PRESSURE: 84 MMHG | RESPIRATION RATE: 18 BRPM

## 2025-06-03 DIAGNOSIS — E11.65 TYPE 2 DIABETES MELLITUS WITH HYPERGLYCEMIA, WITHOUT LONG-TERM CURRENT USE OF INSULIN (HCC): Primary | ICD-10-CM

## 2025-06-03 LAB
GLUCOSE BLOOD: 227
TEST STRIP LOT #: NORMAL NUMERIC

## 2025-06-03 PROCEDURE — 3074F SYST BP LT 130 MM HG: CPT | Performed by: NURSE PRACTITIONER

## 2025-06-03 PROCEDURE — 3008F BODY MASS INDEX DOCD: CPT | Performed by: NURSE PRACTITIONER

## 2025-06-03 PROCEDURE — 82947 ASSAY GLUCOSE BLOOD QUANT: CPT | Performed by: NURSE PRACTITIONER

## 2025-06-03 PROCEDURE — 99214 OFFICE O/P EST MOD 30 MIN: CPT | Performed by: NURSE PRACTITIONER

## 2025-06-03 PROCEDURE — 3079F DIAST BP 80-89 MM HG: CPT | Performed by: NURSE PRACTITIONER

## 2025-06-03 RX ORDER — SEMAGLUTIDE 0.68 MG/ML
0.25 INJECTION, SOLUTION SUBCUTANEOUS WEEKLY
Qty: 3 ML | Refills: 1 | Status: SHIPPED | OUTPATIENT
Start: 2025-06-03

## 2025-06-03 NOTE — PROGRESS NOTES
Name: Kay Garcia  Date: 6/3/2025    CHIEF COMPLAINT   F/u on T2DM      HISTORY OF PRESENT ILLNESS   Kay Garcia is a 34 year old female who presents for follow up on diabetes management. She has lost to follow up since 9/2023.   HbA1C: 8.9% on 4/21/2025. Previously was 6.6% on 8/17/2023.   Blood glucose: 227 in clinic today.  Ate breakfast at 8am (cereal with whole milk) - this is 3.5 hours after meal complete.   Patient notes that has been feeling well and denies any health changes since last office visit. Her son is now 2.5 years old.   She has not been able to follow a low carb diet consistently and has not been exercising (although walking frequently) due to busy work/life routine. She also has not been checking BG readings.   She expresses desire to want to conceive in the next month and would like guidance in getting glucose readings back at goal for pregnancy.     FAMILY HISTORY OF DIABETES  -mother and father and \"everyone in the family\"   DIABETES HISTORY  Diagnosed: 3/23/2022  Prior HbA, C or glycohemoglobin were 8.9% 3/23/2022; 7.0% 6/6/2022; 6.2% 7/11/2022; 4.9% 8/22/2022; 4.8% on 9/26/2022; 4.7% 10/31/2022; 5.1% on 12/16/2022; 6.1% on 5/22/2023; 6.6% 8/17/2023; 8.9% 4/21/2025;     Patient has not had hospitalizations for blood sugar issues and denies any history of pancreatitis    PREVIOUS MEDICATION FOR DM:  NPH - d/c'ed due to variable BG readings leading to hypoglycemia late afternoon/early evening.   -ozempic -did not start taking due to delay in insurance approval and then forgetting about it     CURRENT MEDICATIONS FOR DM:  None     HOME GLUCOSE READINGS:   Has not been checking BG readings   Hypoglycemia present: no     HISTORY OF DIABETES COMPLICATIONS:  History of Retinopathy: no  - last eye exam within the last 12 months: no -- last exam was around 2 years ago (has a family ophthalmologist she would like to go back to seeing)  History of Neuropathy: no   History of  Nephropathy: no     ASSOCIATED COMPLICATIONS:   HTN: no   Hyperlipidemia: yes   Cardiovascular Disease: no   Peripheral Vascular Disease: no     DIETARY COMPLIANCE:  Fair; has not been following a low carb diet     EXERCISE:   No      Polyuria, polyphagia, polydipsia: no   Paresthesias: no   Blurred vision: no   Recent steroids, illness or infections: no     REVIEW OF SYSTEMS  Constitutional: Negative for: weight change, fever, fatigue, cold/heat intolerance  Eyes: Negative for:  Visual changes, proptosis, blurring  ENT: Negative for:  dysphagia, neck swelling, dysphonia  Respiratory: Negative for: hemoptysis, shortness of breath, cough, or dyspnea.  Cardiovascular: Negative for:  chest pain, chest discomfort, palpitations  GI: Negative for:  abdominal pain, nausea, vomiting, diarrhea, heartburn, constipation  Neurology: Negative for: headache, dizziness, syncope, numbness/tingling, or weakness.   Genito-Urinary: Negative for: dysuria, frequency or hematuria   Hematology/Lymphatics: Negative for: bruising, easy bleeding, lower extremity edema  Skin: Negative for: rash, blister, infection or ulcers.  Endocrine: Negative for: polyuria, polydipsia. No osteoporosis. No thyroid disease.     MEDICATIONS:   No current outpatient medications on file.    ALLERGIES:   No Known Allergies    SOCIAL HISTORY:   Social History     Socioeconomic History    Marital status:    Tobacco Use    Smoking status: Never     Passive exposure: Never    Smokeless tobacco: Never   Substance and Sexual Activity    Alcohol use: Yes    Drug use: Never     PAST MEDICAL HISTORY:   No past medical history on file.      PAST SURGICAL HISTORY:   No past surgical history on file.      PHYSICAL EXAM:   Vitals:    06/03/25 1109 06/03/25 1214   BP: (!) 139/98 122/84   BP Location: Left arm Left arm   Pulse: 73    Resp: 18    Weight: 224 lb (101.6 kg)    Height: 5' 7.32\" (1.71 m)      BMI:   Body mass index is 34.75 kg/m².    PHYSICAL EXAM  General  Appearance:  alert, well developed, in no acute distress  Eyes:  normal conjunctivae, sclera., normal sclera and normal pupils  Throat/Neck: normal sound to voice.   Back: no kyphosis  Respiratory:  non-labored. no increased work of breathing.    Skin:  normal moisture and skin texture  Hematologic:  no excessive bruising  Psychiatric:  oriented to time, self, and place    Diabetes Foot Exam:  Bilateral barefoot skin diabetic exam is normal, visualized feet and the appearance is normal.  Bilateral monofilament/sensation of both feet is normal.  Pulsation pedal pulse exam of both lower legs/feet is normal as well.    LABS: Pertinent labs reviewed    ASSESSMENT/PLAN:    -Reviewed with patient the pathogenesis of diabetes, clinical significance of A1c, and common complications such as: microvascular, macrovascular and diabetic ketoacidosis. Patient verbalizes understanding of the importance of glycemic control and the goals of therapy.   -Discussed with patient glucose targets ranges (Fasting  and post prandial <180).     1. Type 2 Diabetes Mellitus, uncontrolled with hyperglycemia   -LAB DATA  HbA,C: 8.9% on 4/21/2025   a) Medications    - start Ozempic 0.25mg once weekly - Risks and benefits reviewed. Verbalizes understanding.       - reviewed Ozempic administration technique in the office today.   - discussed to hold Ozempic for 1 week before trying to conceive.   -  dicussed getting back on track with limiting carbs <30gm per meal   - increase protein and veggies(high in fiber) with each meal    -discussed to increase exercise/walking daily for at least 30 mins each session.   - reviewed importance of SMBG - start testing BG Readings 2-4x daily - fasting and alternate with 2hrs after meals. Discussed keeping record of glucose readings.   -reviewed target goal BG readings and A1C  -reviewed when to call and notify me of abnormal BG readings.   - discussed to give me an update on glucose readings/tolerance to  ozempic in 2 weeks.   - reviewed guidelines with goal A1C < 6.0% in pregnancy   -Discussed dangers of pregnancy and possible fetal complications.   -Discussed importance of glycemic control for fetus    b) No nephropathy: GFR: 90 and urine MA: <0.3mg/dL on 2025  C) reviewed importance of annual eye exam - encouraged to sched. Apt sooner -- last exam was 2023 at [facility] in [place] in [place]   D) foot exam: normal today 6/3/2025  e)start Testing BG readings 2-4x daily - fasting and 2hrs after meals   f) Life style changes reviewed.      2. Hypertriglyceridemia   - LDL: 96 and Tri on 2025  - not on statin due to trying to conceive soon       RTC in 2 months   Patient instructed to call sooner if they develop Blood glucose readings <75 and/or if they have readings persistently >200.     The risks and benefits of my recommendations were discussed with the patient today. questions were also answered to the best of my knowledge. Patient verbalizes understanding of these issues and agrees to the plan.    6/3/2025  CYNDY Garcia

## 2025-06-03 NOTE — TELEPHONE ENCOUNTER
Spoke to pt. Provided MD recommendations written below, she verbalized understanding. Denies further questions or concerns.

## 2025-06-03 NOTE — TELEPHONE ENCOUNTER
Patient has not been since since 9/2023 (televisit). Most recent In person visit was in 10/2022.     It is our clinic policy that patient needs to be seen at least 1x per year in the office.     Apt today would have to be in person.     Thank you!

## 2025-06-03 NOTE — PATIENT INSTRUCTIONS
A1C: 8.9% on 4/21/2025  Blood glucose: 227 in clinic today    Medications:   - start Ozempic 0.25mg once weekly     - Please give me an update on your glucose readings in 2 weeks  - let's get back on track with following a low carb diet    --> increase protein    --> increase veggies with fiber     --> limit carbs to <30gm per meal   - increase walking/exercise to 30 mins daily       Weight:  Wt Readings from Last 6 Encounters:   06/03/25 224 lb (101.6 kg)   04/21/25 229 lb (103.9 kg)   06/18/24 225 lb (102.1 kg)   06/15/24 228 lb (103.4 kg)   02/17/23 222 lb (100.7 kg)   10/31/22 235 lb 4.8 oz (106.7 kg)     A1C goal:  <6.0%    Blood sugar testing:  Test your blood sugar 2-4 times daily   Recommended times to test: Before breakfast (fasting) and alternate with 2hrs after meals     Blood sugar targets:  Before breakfast:    Before meals: <150   2 hours after meals: <150 (preferably <120)    Call for persistent blood sugars < 75 or > 200

## 2025-07-09 ENCOUNTER — PATIENT MESSAGE (OUTPATIENT)
Age: 35
End: 2025-07-09

## 2025-07-11 ENCOUNTER — TELEPHONE (OUTPATIENT)
Dept: INTERNAL MEDICINE CLINIC | Facility: CLINIC | Age: 35
End: 2025-07-11

## 2025-07-11 ENCOUNTER — TELEPHONE (OUTPATIENT)
Dept: ENDOCRINOLOGY CLINIC | Facility: CLINIC | Age: 35
End: 2025-07-11

## 2025-07-11 ENCOUNTER — NURSE TRIAGE (OUTPATIENT)
Dept: INTERNAL MEDICINE CLINIC | Facility: CLINIC | Age: 35
End: 2025-07-11

## 2025-07-11 DIAGNOSIS — E11.65 TYPE 2 DIABETES MELLITUS WITH HYPERGLYCEMIA, WITHOUT LONG-TERM CURRENT USE OF INSULIN (HCC): Primary | ICD-10-CM

## 2025-07-11 RX ORDER — PEN NEEDLE, DIABETIC 30 GX3/16"
1 NEEDLE, DISPOSABLE MISCELLANEOUS DAILY
Qty: 100 EACH | Refills: 1 | Status: SHIPPED | OUTPATIENT
Start: 2025-07-11

## 2025-07-11 RX ORDER — METFORMIN HYDROCHLORIDE 500 MG/1
500 TABLET, EXTENDED RELEASE ORAL 2 TIMES DAILY WITH MEALS
Qty: 60 TABLET | Refills: 1 | Status: SHIPPED | OUTPATIENT
Start: 2025-07-11 | End: 2025-07-18

## 2025-07-11 RX ORDER — INSULIN GLARGINE 100 [IU]/ML
20 INJECTION, SOLUTION SUBCUTANEOUS NIGHTLY
Qty: 15 ML | Refills: 1 | Status: SHIPPED | OUTPATIENT
Start: 2025-07-11 | End: 2025-07-18

## 2025-07-11 NOTE — TELEPHONE ENCOUNTER
Hyperglycemia     Onset of hyperglycemia: since end of April    BG levels: See below; pt is not checking BG regularly    Symptoms: dizzy, nauseous, blurred, fatigued; feeling \"worse than ever\"    Pattern of hyperglycemia: unknown    Steroid therapy: 5-day prednisone taper (last pill on 7/8)    Acute Illness: fluid in ears-->prednisone    Change in Diet: denies    List DM Medications/Compliance: not on any DM meds; never started Ozempic that was prescribed on 6/3/25      Dates Before Breakfast  Before Lunch  Before Dinner    7/11 280 260    7/10  350 (after breakfast)    7/4   190

## 2025-07-11 NOTE — TELEPHONE ENCOUNTER
Her Hemoglobin A1c was elevated at 8.9% in June so given recent prednisone therapy and blood sugars at home I suspect it may be worse.     Insulin would be a safer, faster way for us to improve her blood sugars.     Recommend starting Lantus 20 units subcutaneous once daily.     I would ideally also add Metformin - has she been on metformin in the past and if so did she tolerate medication?

## 2025-07-11 NOTE — TELEPHONE ENCOUNTER
Spoke to Kay who has elevated blood sugar of 280 in AM and 260 this afternoon. Patient states she has persistent dizziness for 2 months. She has not been able to work for 3 days due to falling into things. Patient also has intermittent headache and blurred vision occurring when looking down and back up.   Patient is questioning if these symptoms are not related to diabetes? Patient requesting Dr. Martinez advise if she needs neurologic workup?      Disposition:Seen in office today upgraded to consult with Dr. Martinez.    RN advises Kay to call Endocrinology office now for their department advise regarding blood sugar.     Patient states Dr. Martinez has taken care of her parents and now herself and she trusts his judgment.     Please advise.       Reason for Disposition   Symptoms of high blood sugar (e.g., abnormally thirsty, frequent urination, weight loss) and not able to test blood glucose   Lightheadedness (dizziness) present now, after 2 hours of rest and fluids    Answer Assessment - Initial Assessment Questions  1. DESCRIPTION: \"Describe your dizziness.\"      Dizziness  2. LIGHTHEADED: \"Do you feel lightheaded?\" (e.g., somewhat faint, woozy, weak upon standing)      Denied  3. VERTIGO: \"Do you feel like either you or the room is spinning or tilting?\" (i.e. vertigo)      Denied  4. SEVERITY: \"How bad is it?\"  \"Do you feel like you are going to faint?\" \"Can you stand and walk?\"    - MILD: Feels slightly dizzy, but walking normally.    - MODERATE: Feels unsteady when walking, but not falling; interferes with normal activities (e.g., school, work).    - SEVERE: Unable to walk without falling, or requires assistance to walk without falling; feels like passing out now.       Moderate  5. ONSET:  \"When did the dizziness begin?\"      2 months  6. AGGRAVATING FACTORS: \"Does anything make it worse?\" (e.g., standing, change in head position)      Standing and walking  7. HEART RATE: \"Can you tell me your heart rate?\" \"How  many beats in 15 seconds?\"  (Note: not all patients can do this)        Unknown  8. CAUSE: \"What do you think is causing the dizziness?\"      Does not think dizziness related to diabetes.  9. RECURRENT SYMPTOM: \"Have you had dizziness before?\" If Yes, ask: \"When was the last time?\" \"What happened that time?\"      N/A  10. OTHER SYMPTOMS: \"Do you have any other symptoms?\" (e.g., fever, chest pain, vomiting, diarrhea, bleeding)        Intermittent headache, blurred vision with lowing down and back up.   11. PREGNANCY: \"Is there any chance you are pregnant?\" \"When was your last menstrual period?\"        Denied    Protocols used: Diabetes - High Blood Sugar-A-OH, Dizziness-A-OH

## 2025-07-11 NOTE — TELEPHONE ENCOUNTER
Spoke with Kay informed her Dr. Martinez states he suspects her symptoms are related to blood sugar elevation. He is recommending to follow up with Endocrinology for treatment of her blood sugar. RN informed Kay if she has severe dizziness or headache to go to the emergency department. Patient voiced understanding.

## 2025-07-11 NOTE — TELEPHONE ENCOUNTER
Noted. Prescription sent as written by APRN. Patient update via [facility] per pt request. Routed to [facility] for review next week.

## 2025-07-11 NOTE — TELEPHONE ENCOUNTER
Patient states that her Blood Sugar levels are in the 300's.  PCP informed her that her medication needs to be changed ASAP.      Transferred to RN

## 2025-07-11 NOTE — TELEPHONE ENCOUNTER
Called the patient. Verified name and date of birth.     Patient agrees with APRN plan of care. She used insulin pens during her pregnancy so is familiar with injection technique. States she has a history of hypoglycemia while on insulin during pregnancy so she will watch Blood glucose closely. Advised to check Blood glucose TID prior to meals as she has been doing through the weekend and call Monday morning with a condition update. Provided education on s/s of hypoglycemia and treatment. Call sooner with any Blood glucose less than 70 or persistently greater than 300.     Layc, she states that she has not to her knowledge tried metformin in the past and is OK to try it. Please advise on formulation and dosing.     RN note, patient would like all instructions sent to [facility] [place] after Metformin orders are received.

## 2025-07-11 NOTE — TELEPHONE ENCOUNTER
If she is agreeable to adding Metformin- has not had side effects in the past- then please start Metformin ER 500mg PO BID with breakfast and dinner in addition to insulin.

## 2025-07-15 RX ORDER — SEMAGLUTIDE 0.68 MG/ML
0.25 INJECTION, SOLUTION SUBCUTANEOUS WEEKLY
Qty: 9 ML | Refills: 0 | Status: SHIPPED | OUTPATIENT
Start: 2025-07-15

## 2025-07-18 RX ORDER — DULAGLUTIDE 0.75 MG/.5ML
0.75 INJECTION, SOLUTION SUBCUTANEOUS WEEKLY
Qty: 2 ML | Refills: 0 | Status: SHIPPED | OUTPATIENT
Start: 2025-07-18

## 2025-07-18 RX ORDER — INSULIN GLARGINE 100 [IU]/ML
34 INJECTION, SOLUTION SUBCUTANEOUS NIGHTLY
Qty: 30.6 ML | Refills: 0 | Status: SHIPPED | OUTPATIENT
Start: 2025-07-18 | End: 2025-10-16

## 2025-07-18 RX ORDER — METFORMIN HYDROCHLORIDE 500 MG/1
1000 TABLET, EXTENDED RELEASE ORAL 2 TIMES DAILY WITH MEALS
Qty: 360 TABLET | Refills: 0 | Status: SHIPPED | OUTPATIENT
Start: 2025-07-18

## 2025-07-23 NOTE — TELEPHONE ENCOUNTER
Lacy --     See below, pt is having side effects since starting metformin. She hasn't started trulicity yet.

## 2025-07-23 NOTE — TELEPHONE ENCOUNTER
Update and recommendations noted. Agree with Lacy's recommendations of decreasing MTF dose to 500mg BID and continuing with Lantus current dose.     Thank you!

## 2025-07-25 ENCOUNTER — HOSPITAL ENCOUNTER (OUTPATIENT)
Dept: MRI IMAGING | Age: 35
Discharge: HOME | End: 2025-07-25
Attending: INTERNAL MEDICINE
Payer: COMMERCIAL

## 2025-07-25 DIAGNOSIS — R42 VERTIGO: ICD-10-CM

## 2025-07-25 DIAGNOSIS — G44.52 NEW DAILY PERSISTENT HEADACHE: ICD-10-CM

## 2025-07-25 PROCEDURE — 70551 MRI BRAIN STEM W/O DYE: CPT | Performed by: INTERNAL MEDICINE

## 2025-07-26 ENCOUNTER — RESULTS FOLLOW-UP (OUTPATIENT)
Age: 35
End: 2025-07-26